# Patient Record
Sex: FEMALE | Race: WHITE | Employment: UNEMPLOYED | URBAN - METROPOLITAN AREA
[De-identification: names, ages, dates, MRNs, and addresses within clinical notes are randomized per-mention and may not be internally consistent; named-entity substitution may affect disease eponyms.]

---

## 2017-03-27 ENCOUNTER — HOSPITAL ENCOUNTER (EMERGENCY)
Age: 22
Discharge: HOME | End: 2017-03-28
Attending: EMERGENCY MEDICINE
Payer: COMMERCIAL

## 2017-03-27 VITALS
RESPIRATION RATE: 16 BRPM | HEART RATE: 74 BPM | TEMPERATURE: 98.2 F | HEIGHT: 70 IN | SYSTOLIC BLOOD PRESSURE: 124 MMHG | BODY MASS INDEX: 24.34 KG/M2 | OXYGEN SATURATION: 96 % | WEIGHT: 170 LBS | DIASTOLIC BLOOD PRESSURE: 50 MMHG

## 2017-03-27 DIAGNOSIS — R79.89 ELEVATED LFTS: ICD-10-CM

## 2017-03-27 DIAGNOSIS — R11.10 VOMITING AND DIARRHEA: Primary | ICD-10-CM

## 2017-03-27 DIAGNOSIS — R10.84 ABDOMINAL PAIN, GENERALIZED: ICD-10-CM

## 2017-03-27 DIAGNOSIS — R19.7 VOMITING AND DIARRHEA: Primary | ICD-10-CM

## 2017-03-27 LAB
ALBUMIN SERPL BCP-MCNC: 4.1 G/DL (ref 3.4–5)
ALBUMIN/GLOB SERPL: 1.1 {RATIO} (ref 0.8–1.7)
ALP SERPL-CCNC: 58 U/L (ref 45–117)
ALT SERPL-CCNC: 123 U/L (ref 13–56)
ANION GAP BLD CALC-SCNC: 7 MMOL/L (ref 3–18)
APPEARANCE UR: CLEAR
AST SERPL W P-5'-P-CCNC: 163 U/L (ref 15–37)
BASOPHILS # BLD AUTO: 0 K/UL (ref 0–0.06)
BASOPHILS # BLD: 0 % (ref 0–2)
BILIRUB DIRECT SERPL-MCNC: 0.2 MG/DL (ref 0–0.2)
BILIRUB SERPL-MCNC: 1.1 MG/DL (ref 0.2–1)
BILIRUB UR QL: NEGATIVE
BUN SERPL-MCNC: 9 MG/DL (ref 7–18)
BUN/CREAT SERPL: 11 (ref 12–20)
CALCIUM SERPL-MCNC: 9.3 MG/DL (ref 8.5–10.1)
CHLORIDE SERPL-SCNC: 96 MMOL/L (ref 100–108)
CO2 SERPL-SCNC: 26 MMOL/L (ref 21–32)
COLOR UR: YELLOW
CREAT SERPL-MCNC: 0.81 MG/DL (ref 0.6–1.3)
DIFFERENTIAL METHOD BLD: ABNORMAL
EOSINOPHIL # BLD: 0 K/UL (ref 0–0.4)
EOSINOPHIL NFR BLD: 0 % (ref 0–5)
EPITH CASTS URNS QL MICRO: NORMAL /LPF (ref 0–5)
ERYTHROCYTE [DISTWIDTH] IN BLOOD BY AUTOMATED COUNT: 12.7 % (ref 11.6–14.5)
FLUAV AG NPH QL IA: NEGATIVE
FLUBV AG NOSE QL IA: NEGATIVE
GLOBULIN SER CALC-MCNC: 3.6 G/DL (ref 2–4)
GLUCOSE SERPL-MCNC: 98 MG/DL (ref 74–99)
GLUCOSE UR STRIP.AUTO-MCNC: NEGATIVE MG/DL
HCG UR QL: NEGATIVE
HCT VFR BLD AUTO: 39.2 % (ref 35–45)
HGB BLD-MCNC: 13.2 G/DL (ref 12–16)
HGB UR QL STRIP: ABNORMAL
KETONES UR QL STRIP.AUTO: 80 MG/DL
LEUKOCYTE ESTERASE UR QL STRIP.AUTO: NEGATIVE
LIPASE SERPL-CCNC: 146 U/L (ref 73–393)
LYMPHOCYTES # BLD AUTO: 4 % (ref 21–52)
LYMPHOCYTES # BLD: 0.3 K/UL (ref 0.9–3.6)
MCH RBC QN AUTO: 30.1 PG (ref 24–34)
MCHC RBC AUTO-ENTMCNC: 33.7 G/DL (ref 31–37)
MCV RBC AUTO: 89.3 FL (ref 74–97)
MONOCYTES # BLD: 0.6 K/UL (ref 0.05–1.2)
MONOCYTES NFR BLD AUTO: 6 % (ref 3–10)
NEUTS SEG # BLD: 8.2 K/UL (ref 1.8–8)
NEUTS SEG NFR BLD AUTO: 90 % (ref 40–73)
NITRITE UR QL STRIP.AUTO: NEGATIVE
PH UR STRIP: 7 [PH] (ref 5–8)
PLATELET # BLD AUTO: 258 K/UL (ref 135–420)
PMV BLD AUTO: 9.3 FL (ref 9.2–11.8)
POTASSIUM SERPL-SCNC: 3.8 MMOL/L (ref 3.5–5.5)
PROT SERPL-MCNC: 7.7 G/DL (ref 6.4–8.2)
PROT UR STRIP-MCNC: NEGATIVE MG/DL
RBC # BLD AUTO: 4.39 M/UL (ref 4.2–5.3)
RBC #/AREA URNS HPF: NORMAL /HPF (ref 0–5)
SODIUM SERPL-SCNC: 129 MMOL/L (ref 136–145)
SP GR UR REFRACTOMETRY: 1.02 (ref 1–1.03)
UROBILINOGEN UR QL STRIP.AUTO: 1 EU/DL (ref 0.2–1)
WBC # BLD AUTO: 9.1 K/UL (ref 4.6–13.2)
WBC URNS QL MICRO: NORMAL /HPF (ref 0–4)

## 2017-03-27 PROCEDURE — 80076 HEPATIC FUNCTION PANEL: CPT | Performed by: EMERGENCY MEDICINE

## 2017-03-27 PROCEDURE — 96375 TX/PRO/DX INJ NEW DRUG ADDON: CPT

## 2017-03-27 PROCEDURE — 74011250637 HC RX REV CODE- 250/637: Performed by: EMERGENCY MEDICINE

## 2017-03-27 PROCEDURE — 96361 HYDRATE IV INFUSION ADD-ON: CPT

## 2017-03-27 PROCEDURE — 99283 EMERGENCY DEPT VISIT LOW MDM: CPT

## 2017-03-27 PROCEDURE — 81025 URINE PREGNANCY TEST: CPT | Performed by: EMERGENCY MEDICINE

## 2017-03-27 PROCEDURE — 85025 COMPLETE CBC W/AUTO DIFF WBC: CPT | Performed by: EMERGENCY MEDICINE

## 2017-03-27 PROCEDURE — 87804 INFLUENZA ASSAY W/OPTIC: CPT | Performed by: EMERGENCY MEDICINE

## 2017-03-27 PROCEDURE — 74011000250 HC RX REV CODE- 250: Performed by: EMERGENCY MEDICINE

## 2017-03-27 PROCEDURE — 80048 BASIC METABOLIC PNL TOTAL CA: CPT | Performed by: EMERGENCY MEDICINE

## 2017-03-27 PROCEDURE — 96374 THER/PROPH/DIAG INJ IV PUSH: CPT

## 2017-03-27 PROCEDURE — 74011250636 HC RX REV CODE- 250/636: Performed by: EMERGENCY MEDICINE

## 2017-03-27 PROCEDURE — 81001 URINALYSIS AUTO W/SCOPE: CPT | Performed by: EMERGENCY MEDICINE

## 2017-03-27 PROCEDURE — 83690 ASSAY OF LIPASE: CPT | Performed by: EMERGENCY MEDICINE

## 2017-03-27 RX ORDER — ONDANSETRON 4 MG/1
4 TABLET, ORALLY DISINTEGRATING ORAL
Qty: 14 TAB | Refills: 0 | Status: SHIPPED | OUTPATIENT
Start: 2017-03-27 | End: 2018-10-24 | Stop reason: ALTCHOICE

## 2017-03-27 RX ORDER — LOPERAMIDE HYDROCHLORIDE 2 MG/1
2 CAPSULE ORAL ONCE
Status: COMPLETED | OUTPATIENT
Start: 2017-03-27 | End: 2017-03-27

## 2017-03-27 RX ORDER — FAMOTIDINE 10 MG/ML
20 INJECTION INTRAVENOUS
Status: COMPLETED | OUTPATIENT
Start: 2017-03-27 | End: 2017-03-27

## 2017-03-27 RX ORDER — SODIUM CHLORIDE 9 MG/ML
1000 INJECTION, SOLUTION INTRAVENOUS ONCE
Status: COMPLETED | OUTPATIENT
Start: 2017-03-27 | End: 2017-03-27

## 2017-03-27 RX ORDER — ONDANSETRON 2 MG/ML
4 INJECTION INTRAMUSCULAR; INTRAVENOUS
Status: COMPLETED | OUTPATIENT
Start: 2017-03-27 | End: 2017-03-27

## 2017-03-27 RX ORDER — FAMOTIDINE 20 MG/1
20 TABLET, FILM COATED ORAL DAILY
Qty: 10 TAB | Refills: 0 | Status: SHIPPED | OUTPATIENT
Start: 2017-03-27 | End: 2017-04-06

## 2017-03-27 RX ORDER — KETOROLAC TROMETHAMINE 30 MG/ML
30 INJECTION, SOLUTION INTRAMUSCULAR; INTRAVENOUS
Status: COMPLETED | OUTPATIENT
Start: 2017-03-27 | End: 2017-03-27

## 2017-03-27 RX ADMIN — KETOROLAC TROMETHAMINE 30 MG: 30 INJECTION INTRAMUSCULAR; INTRAVENOUS at 21:48

## 2017-03-27 RX ADMIN — LOPERAMIDE HYDROCHLORIDE 2 MG: 2 CAPSULE ORAL at 22:17

## 2017-03-27 RX ADMIN — SODIUM CHLORIDE 1000 ML: 900 INJECTION, SOLUTION INTRAVENOUS at 21:47

## 2017-03-27 RX ADMIN — FAMOTIDINE 20 MG: 10 INJECTION, SOLUTION INTRAVENOUS at 21:48

## 2017-03-27 RX ADMIN — ONDANSETRON HYDROCHLORIDE 4 MG: 2 SOLUTION INTRAMUSCULAR; INTRAVENOUS at 21:48

## 2017-03-27 RX ADMIN — SODIUM CHLORIDE 1000 ML: 900 INJECTION, SOLUTION INTRAVENOUS at 22:26

## 2017-03-27 NOTE — LETTER
[address] [place] [address] [facility] 
[address] [facility] [id number] [phone] Work/School Note Date: 3/27/2017 To Whom It May concern: 
 
Mary Foster was seen and treated today in the emergency room by the following provider(s): 
Attending Provider: Henry Ball MD. Mary Foster may return to work on 3/29/17. Sincerely, Lenora Duffy RN

## 2017-03-28 NOTE — DISCHARGE INSTRUCTIONS
Return for pain, fever, shortness of breath, vomiting, decreased fluid intake, weakness, numbness, dizziness, or any change or concerns. Avoid tylenol and alcohol until follow up as we discussed. Diarrhea: Care Instructions  Your Care Instructions    Diarrhea is loose, watery stools (bowel movements). The exact cause is often hard to find. Sometimes diarrhea is your body's way of getting rid of what caused an upset stomach. Viruses, food poisoning, and many medicines can cause diarrhea. Some people get diarrhea in response to emotional stress, anxiety, or certain foods. Almost everyone has diarrhea now and then. It usually isn't serious, and your stools will return to normal soon. The important thing to do is replace the fluids you have lost, so you can prevent dehydration. The doctor has checked you carefully, but problems can develop later. If you notice any problems or new symptoms, get medical treatment right away. Follow-up care is a key part of your treatment and safety. Be sure to make and go to all appointments, and call your doctor if you are having problems. It's also a good idea to know your test results and keep a list of the medicines you take. How can you care for yourself at home? · Watch for signs of dehydration, which means your body has lost too much water. Dehydration is a serious condition and should be treated right away. Signs of dehydration are:  ¨ Increasing thirst and dry eyes and mouth. ¨ Feeling faint or lightheaded. ¨ Darker urine, and a smaller amount of urine than normal.  · To prevent dehydration, drink plenty of fluids, enough so that your urine is light yellow or clear like water. Choose water and other caffeine-free clear liquids until you feel better. If you have kidney, heart, or liver disease and have to limit fluids, talk with your doctor before you increase the amount of fluids you drink.   · Begin eating small amounts of mild foods the next day, if you feel like it. ¨ Try yogurt that has live cultures of Lactobacillus. (Check the label.)  ¨ Avoid spicy foods, fruits, alcohol, and caffeine until 48 hours after all symptoms are gone. ¨ Avoid chewing gum that contains sorbitol. ¨ Avoid dairy products (except for yogurt with Lactobacillus) while you have diarrhea and for 3 days after symptoms are gone. · The doctor may recommend that you take over-the-counter medicine, such as loperamide (Imodium), if you still have diarrhea after 6 hours. Read and follow all instructions on the label. Do not use this medicine if you have bloody diarrhea, a high fever, or other signs of serious illness. Call your doctor if you think you are having a problem with your medicine. When should you call for help? Call 911 anytime you think you may need emergency care. For example, call if:  · You passed out (lost consciousness). · Your stools are maroon or very bloody. Call your doctor now or seek immediate medical care if:  · You are dizzy or lightheaded, or you feel like you may faint. · Your stools are black and look like tar, or they have streaks of blood. · You have new or worse belly pain. · You have symptoms of dehydration, such as:  ¨ Dry eyes and a dry mouth. ¨ Passing only a little dark urine. ¨ Feeling thirstier than usual.  · You have a new or higher fever. Watch closely for changes in your health, and be sure to contact your doctor if:  · Your diarrhea is getting worse. · You see pus in the diarrhea. · You are not getting better after 2 days (48 hours). Where can you learn more? Go to http://dionne-cezar.info/. Enter H331 in the search box to learn more about \"Diarrhea: Care Instructions. \"  Current as of: May 27, 2016  Content Version: 11.1  © 2676-2552 Printed Piece, [employer]. Care instructions adapted under license by [employer] (which disclaims liability or warranty for this information).  If you have questions about a medical condition or this instruction, always ask your healthcare professional. [address] any warranty or liability for your use of this information. Abdominal Pain: Care Instructions  Your Care Instructions    Abdominal pain has many possible causes. Some aren't serious and get better on their own in a few days. Others need more testing and treatment. If your pain continues or gets worse, you need to be rechecked and may need more tests to find out what is wrong. You may need surgery to correct the problem. Don't ignore new symptoms, such as fever, nausea and vomiting, urination problems, pain that gets worse, and dizziness. These may be signs of a more serious problem. Your doctor may have recommended a follow-up visit in the next 8 to 12 hours. If you are not getting better, you may need more tests or treatment. The doctor has checked you carefully, but problems can develop later. If you notice any problems or new symptoms, get medical treatment right away. Follow-up care is a key part of your treatment and safety. Be sure to make and go to all appointments, and call your doctor if you are having problems. It's also a good idea to know your test results and keep a list of the medicines you take. How can you care for yourself at home? · Rest until you feel better. · To prevent dehydration, drink plenty of fluids, enough so that your urine is light yellow or clear like water. Choose water and other caffeine-free clear liquids until you feel better. If you have kidney, heart, or liver disease and have to limit fluids, talk with your doctor before you increase the amount of fluids you drink. · If your stomach is upset, eat mild foods, such as rice, dry toast or crackers, bananas, and applesauce. Try eating several small meals instead of two or three large ones.   · Wait until 48 hours after all symptoms have gone away before you have spicy foods, alcohol, and drinks that contain caffeine. · Do not eat foods that are high in fat. · Avoid anti-inflammatory medicines such as aspirin, ibuprofen (Advil, Motrin), and naproxen (Aleve). These can cause stomach upset. Talk to your doctor if you take daily aspirin for another health problem. When should you call for help? Call 911 anytime you think you may need emergency care. For example, call if:  · You passed out (lost consciousness). · You pass maroon or very bloody stools. · You vomit blood or what looks like coffee grounds. · You have new, severe belly pain. Call your doctor now or seek immediate medical care if:  · Your pain gets worse, especially if it becomes focused in one area of your belly. · You have a new or higher fever. · Your stools are black and look like tar, or they have streaks of blood. · You have unexpected vaginal bleeding. · You have symptoms of a urinary tract infection. These may include:  ¨ Pain when you urinate. ¨ Urinating more often than usual.  ¨ Blood in your urine. · You are dizzy or lightheaded, or you feel like you may faint. Watch closely for changes in your health, and be sure to contact your doctor if:  · You are not getting better after 1 day (24 hours). Where can you learn more? Go to http://dionne-cezar.info/. Enter I907 in the search box to learn more about \"Abdominal Pain: Care Instructions. \"  Current as of: May 27, 2016  Content Version: 11.1  © 8250-2296 [employer]. Care instructions adapted under license by [employer] (which disclaims liability or warranty for this information). If you have questions about a medical condition or this instruction, always ask your healthcare professional. [place] [address] any warranty or liability for your use of this information.            Nausea and Vomiting: Care Instructions  Your Care Instructions    When you are nauseated, you may feel weak and sweaty and notice a lot of saliva in your mouth. Nausea often leads to vomiting. Most of the time you do not need to worry about nausea and vomiting, but they can be signs of other illnesses. Two common causes of nausea and vomiting are stomach flu and food poisoning. Nausea and vomiting from viral stomach flu will usually start to improve within 24 hours. Nausea and vomiting from food poisoning may last from 12 to 48 hours. The doctor has checked you carefully, but problems can develop later. If you notice any problems or new symptoms, get medical treatment right away. Follow-up care is a key part of your treatment and safety. Be sure to make and go to all appointments, and call your doctor if you are having problems. It's also a good idea to know your test results and keep a list of the medicines you take. How can you care for yourself at home? · To prevent dehydration, drink plenty of fluids, enough so that your urine is light yellow or clear like water. Choose water and other caffeine-free clear liquids until you feel better. If you have kidney, heart, or liver disease and have to limit fluids, talk with your doctor before you increase the amount of fluids you drink. · Rest in bed until you feel better. · When you are able to eat, try clear soups, mild foods, and liquids until all symptoms are gone for 12 to 48 hours. Other good choices include dry toast, crackers, cooked cereal, and gelatin dessert, such as Jell-O. When should you call for help? Call 911 anytime you think you may need emergency care. For example, call if:  · You passed out (lost consciousness). Call your doctor now or seek immediate medical care if:  · You have symptoms of dehydration, such as:  ¨ Dry eyes and a dry mouth. ¨ Passing only a little dark urine. ¨ Feeling thirstier than usual.  · You have new or worsening belly pain. · You have a new or higher fever. · You vomit blood or what looks like coffee grounds.   Watch closely for changes in your health, and be sure to contact your doctor if:  · You have ongoing nausea and vomiting. · Your vomiting is getting worse. · Your vomiting lasts longer than 2 days. · You are not getting better as expected. Where can you learn more? Go to http://dionne-cezar.info/. Enter 25 [id number] in the search box to learn more about \"Nausea and Vomiting: Care Instructions. \"  Current as of: May 27, 2016  Content Version: 11.1  © 5919-0504 [employer]. Care instructions adapted under license by [employer] (which disclaims liability or warranty for this information). If you have questions about a medical condition or this instruction, always ask your healthcare professional. [place] [address] any warranty or liability for your use of this information.

## 2017-03-28 NOTE — ED PROVIDER NOTES
HPI Comments: 9:04 PM Renee  is a 22 y.o. female presenting to the ED with watery diarrhea that began today. The patient also reports hot flashes, light headedness, chills, generalized myalgias, cough, abd pain, lower back pain and vomiting as associated symptoms. The vomiting began at 2 pm and the diarrhea has been all day. She has been able to keep water down. She describes the abd pain as cramping. Her mom states she does crossfit and she recently did an intense workout recently too. Pt denies urinary symptoms. No other complaints at this time. Patient is a 22 y.o. female presenting with vomiting, cough, and diarrhea. The history is provided by the patient. Vomiting    Associated symptoms include abdominal pain, diarrhea, myalgias and cough. Pertinent negatives include no fever. Cough   Associated symptoms include myalgias, nausea and vomiting. Pertinent negatives include no chest pain and no shortness of breath. Diarrhea    Associated symptoms include diarrhea, nausea, vomiting, myalgias and back pain. Pertinent negatives include no fever and no chest pain. History reviewed. No pertinent past medical history. History reviewed. No pertinent surgical history. Family History:   Problem Relation Age of Onset    Hypertension Mother     Stroke Neg Hx     Cancer Neg Hx     Diabetes Neg Hx     Heart Disease Neg Hx        Social History     Social History    Marital status: SINGLE     Spouse name: N/A    Number of children: N/A    Years of education: N/A     Occupational History    Not on file. Social History Main Topics    Smoking status: Never Smoker    Smokeless tobacco: Not on file    Alcohol use Yes      Comment: socially/rarely    Drug use: No    Sexual activity: Yes     Birth control/ protection: Pill     Other Topics Concern    Not on file     Social History Narrative         ALLERGIES: Review of patient's allergies indicates no known allergies.     Review of Systems   Constitutional: Negative for fever. HENT: Negative for congestion. Respiratory: Positive for cough. Negative for shortness of breath. Cardiovascular: Negative for chest pain. Gastrointestinal: Positive for abdominal pain, diarrhea, nausea and vomiting. Musculoskeletal: Positive for back pain and myalgias. Skin: Negative for rash. Neurological: Negative for light-headedness. All other systems reviewed and are negative. Vitals:    03/27/17 2052 03/27/17 2349   BP: 115/69 124/50   Pulse: (!) 106 74   Resp: 18 16   Temp: 98.2 °F (36.8 °C)    SpO2: 100% 96%   Weight: 77.1 kg (170 lb)    Height: 5' 9.5\" (1.765 m)             Physical Exam   Constitutional: She is oriented to person, place, and time. She appears well-developed. pale   HENT:   Head: Normocephalic. Mouth/Throat: Oropharynx is clear and moist. Mucous membranes are pale and dry. Eyes: Pupils are equal, round, and reactive to light. Neck: Normal range of motion. Cardiovascular: Normal rate and normal heart sounds. No murmur heard. Pulmonary/Chest: Effort normal. She has no wheezes. She has no rales. Abdominal: Soft. There is tenderness. Mild diffuse mid abd tenderness   Musculoskeletal: Normal range of motion. She exhibits no edema. Neurological: She is alert and oriented to person, place, and time. Skin: Skin is warm and dry. Nursing note and vitals reviewed. MDM  ED Course       Procedures      Vitals:  No data found. Pulsox interpreted within normal limits.      Medications ordered:   Medications   0.9% sodium chloride infusion 1,000 mL (0 mL IntraVENous IV Completed 3/27/17 2247)   ondansetron (ZOFRAN) injection 4 mg (4 mg IntraVENous Given 3/27/17 2148)   famotidine (PF) (PEPCID) injection 20 mg (20 mg IntraVENous Given 3/27/17 2148)   ketorolac (TORADOL) injection 30 mg (30 mg IntraVENous Given 3/27/17 2148)   loperamide (IMODIUM) capsule 2 mg (2 mg Oral Given 3/27/17 2217)   0.9% sodium chloride infusion 1,000 mL (0 mL IntraVENous IV Completed 3/27/17 6015)         Lab findings:  No results found for this or any previous visit (from the past 12 hour(s)). Progress notes, Consult notes or additional Procedure notes:   10:58 PM Pt reevaluated at this time and is resting comfortably in NAD feeling improved. No nausea no tenderness. 11:38 PM Pt is pain free tolerating fluids well. 11:38 PM Pt aware of need to avoid Tylenol and alcohol for LFT elevation. Discussed results and findings, as well as, diagnosis and plan for discharge. Pt verbalizes understanding and agreement with plan. All questions addressed at this time. Disposition:  Diagnosis:   1. Vomiting and diarrhea    2. Elevated LFTs    3. Abdominal pain, generalized        Disposition: discharged    Follow-up Information     Follow up With Details Comments [address], [place] Schedule an appointment as soon as possible for a visit in 2 days  [address] [facility] [address] [facility] [address] an appointment as soon as possible for a visit in 2 days  [address] [facility] [address] [facility]  [phone]           Discharge Medication List as of 3/27/2017 11:40 PM      START taking these medications    Details   ondansetron (ZOFRAN ODT) 4 mg disintegrating tablet Take 1 Tab by mouth every eight (8) hours as needed for Nausea. , Print, Disp-14 Tab, R-0      famotidine (PEPCID) 20 mg tablet Take 1 Tab by mouth daily for 10 days. , Print, Disp-10 Tab, R-0         CONTINUE these medications which have NOT CHANGED    Details   norgestimate-ethinyl estradiol (ORTHO-CYCLEN) 0.25-35 mg-mcg tab Take 1 Tab by mouth daily. , Historical Med             SCRIBE ATTESTATION STATEMENT  Documented by: Rebeca Guzmán scribing for, and in the presence of,No att. providers found      Signed by: Sukhwinder Cheng, 03/30/17, 9:06 PM    PROVIDER ATTESTATION STATEMENT  I personally performed the services described in the documentation, reviewed the documentation, as recorded by the scribe in my presence, and it accurately and completely records my words and actions.   No att. providers found

## 2018-04-04 ENCOUNTER — OFFICE VISIT (OUTPATIENT)
Dept: INTERNAL MEDICINE CLINIC | Age: 23
End: 2018-04-04

## 2018-04-04 VITALS
BODY MASS INDEX: 24.91 KG/M2 | RESPIRATION RATE: 14 BRPM | HEIGHT: 70 IN | TEMPERATURE: 98.5 F | SYSTOLIC BLOOD PRESSURE: 114 MMHG | HEART RATE: 83 BPM | DIASTOLIC BLOOD PRESSURE: 78 MMHG | WEIGHT: 174 LBS | OXYGEN SATURATION: 98 %

## 2018-04-04 DIAGNOSIS — J02.9 PHARYNGITIS, UNSPECIFIED ETIOLOGY: ICD-10-CM

## 2018-04-04 DIAGNOSIS — J01.00 ACUTE MAXILLARY SINUSITIS, RECURRENCE NOT SPECIFIED: Primary | ICD-10-CM

## 2018-04-04 RX ORDER — AZITHROMYCIN 250 MG/1
TABLET, FILM COATED ORAL
Qty: 6 TAB | Refills: 0 | Status: SHIPPED | OUTPATIENT
Start: 2018-04-04 | End: 2018-04-09

## 2018-04-04 RX ORDER — LORATADINE 10 MG/1
10 TABLET ORAL DAILY
Qty: 30 TAB | Refills: 0 | Status: SHIPPED | OUTPATIENT
Start: 2018-04-04 | End: 2018-06-22 | Stop reason: SDUPTHER

## 2018-04-04 NOTE — LETTER
NOTIFICATION RETURN TO WORK / SCHOOL 
 
4/4/2018 12:13 PM 
 
Ms. Ramy Tillman [address] [facility] [address] To Whom It May Concern: 
 
Ramy Tillman is currently under the care of Mary Cardona. She will return to work/school on: 4/5/18 If there are questions or concerns please have the patient contact our office.  
 
 
 
Sincerely, 
 
 
Eun Means MD

## 2018-04-04 NOTE — MR AVS SNAPSHOT
[address] [facility] [address] 
 
 
 [address], [address] [facility] 
[phone] Patient: Chidi Carney MRN: [id number] Dara Juarez Visit Information Date & Time Provider Department Dept. Phone Encounter #  
 4/4/2018 11:30 AM Brianne Sanchez MD [facility] [address] [place] [address] [phone] [id number] Follow-up Instructions Return if symptoms worsen or fail to improve. Your Appointments 7/2/2018  3:00 PM  
New Patient with Nancy Fitzpatrick MD  
[facility] [address] [place] [address] [facility]) Appt Note: New Patient - Physical  
 [address] [facility] [id number] [address] [facility]  
  
   
 [address], [address] Upcoming Health Maintenance Date Due  
 HPV AGE 9Y-34Y (1 of 3 - Female 3 Dose Series) 8/9/2007 Influenza Age 5 to Adult 8/1/2017 DTaP/Tdap/Td series (1 - Tdap) 8/9/2017 PAP AKA CERVICAL CYTOLOGY 8/9/2017 Allergies as of 4/4/2018  Review Complete On: 4/4/2018 By: Brianne Sanchez MD  
 No Known Allergies Current Immunizations  Never Reviewed No immunizations on file. Not reviewed this visit You Were Diagnosed With   
  
 Codes Comments Acute maxillary sinusitis, recurrence not specified    -  Primary ICD-10-CM: J01.00 ICD-9-CM: 461.0 Pharyngitis, unspecified etiology     ICD-10-CM: J02.9 ICD-9-CM: 797 Vitals BP Pulse Temp Resp Height(growth percentile) Weight(growth percentile) 114/78 (BP 1 Location: Left arm, BP Patient Position: Sitting) 83 98.5 °F (36.9 °C) (Oral) 14 5' 9.5\" (1.765 m) 174 lb (78.9 kg) SpO2 BMI OB Status Smoking Status 98% 25.33 kg/m2 Having regular periods Never Smoker Vitals History BMI and BSA Data Body Mass Index Body Surface Area  
 25.33 kg/m 2 1.97 m 2 Preferred Pharmacy Pharmacy Name Phone [address] [phone] Your Updated Medication List  
  
   
This list is accurate as of 4/4/18 12:18 PM.  Always use your most recent med list.  
  
  
  
  
 azithromycin 250 mg tablet Commonly known as:  Aiden Nazario Take 2 tablets today, then take 1 tablet daily  
  
 loratadine 10 mg tablet Commonly known as:  Autumn Chimera Take 1 Tab by mouth daily. Indications: Allergic Rhinitis  
  
 norgestimate-ethinyl estradiol 0.25-35 mg-mcg Tab Commonly known as:  Fleming Demark Take 1 Tab by mouth daily. ondansetron 4 mg disintegrating tablet Commonly known as:  ZOFRAN ODT Take 1 Tab by mouth every eight (8) hours as needed for Nausea. Prescriptions Sent to Pharmacy Refills  
 azithromycin (ZITHROMAX) 250 mg tablet 0 Sig: Take 2 tablets today, then take 1 tablet daily Class: Normal  
 Pharmacy: [facility] [address] [place] [address] Ph #: [phone]  
 loratadine (CLARITIN) 10 mg tablet 0 Sig: Take 1 Tab by mouth daily. Indications: Allergic Rhinitis Class: Normal  
 Pharmacy: [address] Ph #: [phone] Route: Oral  
  
We Performed the Following AMB POC RAPID STREP A [03268 CPT(R)] Follow-up Instructions Return if symptoms worsen or fail to improve. Introducing [facility]! [facility] introduces CumuLogic patient portal. Now you can access parts of your medical record, email your doctor's office, and request medication refills online. 1. In your internet browser, go to [email or web address] 2. Click on the First Time User? Click Here link in the Sign In box. You will see the New Member Sign Up page. 3. Enter your CumuLogic Access Code exactly as it appears below. You will not need to use this code after youve completed the sign-up process.  If you do not sign up before the expiration date, you must request a new code. · Mobilitie Access Code: [email or web address] Expires: 7/3/2018 11:20 AM 
 
4. Enter the last four digits of your Social Security Number (xxxx) and Date of Birth (mm/dd/yyyy) as indicated and click Submit. You will be taken to the next sign-up page. 5. Create a Mobilitie ID. This will be your Mobilitie login ID and cannot be changed, so think of one that is secure and easy to remember. 6. Create a Mobilitie password. You can change your password at any time. 7. Enter your Password Reset Question and Answer. This can be used at a later time if you forget your password. 8. Enter your e-mail address. You will receive e-mail notification when new information is available in [address] 9. Click Sign Up. You can now view and download portions of your medical record. 10. Click the Download Summary menu link to download a portable copy of your medical information. If you have questions, please visit the Frequently Asked Questions section of the Mobilitie website. Remember, Mobilitie is NOT to be used for urgent needs. For medical emergencies, dial 911. Now available from your iPhone and Android! Please provide this summary of care documentation to your next provider. Your primary care clinician is listed as Nir Rojas. Renee Leal. If you have any questions after today's visit, please call [phone].

## 2018-04-04 NOTE — PROGRESS NOTES
1. Have you been to the ER, urgent care clinic or hospitalized since your last visit? NO.     2. Have you seen or consulted any other health care providers outside of the [address] [place] [address] since your last visit (Include any pap smears or colon screening)? NO      Do you have an Advanced Directive? NO    Would you like information on Advanced Directives?  NO

## 2018-04-04 NOTE — LETTER
NOTIFICATION RETURN TO WORK / SCHOOL 
 
4/4/2018 12:03 PM 
 
Ms. Lesley Felix [address] [facility] [address] To Whom It May Concern: 
 
Lesley Felix is currently under the care of Mary Cardona. She will return to work/school on: 4/5/18 If there are questions or concerns please have the patient contact our office.  
 
 
 
Sincerely, 
 
 
Jose Alberto Boyd MD

## 2018-04-04 NOTE — PROGRESS NOTES
HPI     Dinesh Munguia is a 25 y.o. female with no significant PMH who presents today for evaluation of sinus congestion, associated with headache, malaise, fever, chills, fatigue, cough with greenish sputum. Onset 4 days ago. Her \"fever\" was on Monday of 100 F. She rested at home and called in sick because she was feeling very ill and fatigued. She denies any h/o tobacco use. Did not get influenza vaccine last season. Unsure of sick contacts. HA is retro-orbital, frontal and maxillary, throbbing like mild-to-moderate, non radiated. She says she has had sinus infections in the past with similar presentation and has had h/o \"strep throat\" presenting with URI symptoms but no sore throat in the past. She denies any SOB, wheezing, CP, palpitations, dizziness, N/V/D. Denies any antibiotic allergies. Works as a . ROS  As above included in HPI. Otherwise 11 point review of systems negative including constitutional, skin, HENT, eyes, respiratory, cardiovascular, gastrointestinal, genitourinary, musculoskeletal, endocrine, hematologic, allergy, and neurologic. Past Medical History  No past medical history on file. No past surgical history on file. Family History  Family History   Problem Relation Age of Onset    Hypertension Mother     Stroke Neg Hx     Cancer Neg Hx     Diabetes Neg Hx     Heart Disease Neg Hx        Social History  She  reports that she has never smoked. She does not have any smokeless tobacco history on file. History   Alcohol Use    Yes     Comment: socially/rarely       Immunization History    There is no immunization history on file for this patient. Allergies  No Known Allergies    Medications  Current Outpatient Prescriptions   Medication Sig    norgestimate-ethinyl estradiol (ORTHO-CYCLEN) 0.25-35 mg-mcg tab Take 1 Tab by mouth daily.  ondansetron (ZOFRAN ODT) 4 mg disintegrating tablet Take 1 Tab by mouth every eight (8) hours as needed for Nausea.      No current facility-administered medications for this visit. Visit Vitals    /78 (BP 1 Location: Left arm, BP Patient Position: Sitting)    Pulse 83    Temp 98.5 °F (36.9 °C) (Oral)    Resp 14    Ht 5' 9.5\" (1.765 m)    Wt 174 lb (78.9 kg)    SpO2 98%    BMI 25.33 kg/m2     Body mass index is 25.33 kg/(m^2). Physical Exam   Constitutional: She is oriented to person, place, and time and well-developed, well-nourished, and in no distress. HENT:   Head: Normocephalic and atraumatic. Right Ear: External ear normal.   Left Ear: External ear normal.   Mouth/Throat: No oropharyngeal exudate. Enlarged tonsils, with no clear exudates, but significant congestion, erythema  Postnasal drip- white thick mucus  Nasal congestion   Eyes: EOM are normal. Pupils are equal, round, and reactive to light. Neck: Normal range of motion. Neck supple. Cardiovascular: Normal rate, regular rhythm, normal heart sounds and intact distal pulses. Pulmonary/Chest: Effort normal and breath sounds normal. No respiratory distress. Abdominal: Soft. Bowel sounds are normal.   Musculoskeletal: Normal range of motion. She exhibits no edema. Neurological: She is alert and oriented to person, place, and time. Skin: Skin is warm. No rash noted. No erythema. No pallor. Psychiatric: Mood and affect normal.   Nursing note and vitals reviewed. REVIEW OF DATA    Labs  No visits with results within 1 Month(s) from this visit.   Latest known visit with results is:    Admission on 03/27/2017, Discharged on 03/28/2017   Component Date Value Ref Range Status    Color 03/27/2017 YELLOW    Final    Appearance 03/27/2017 CLEAR    Final    Specific gravity 03/27/2017 1.022  1.005 - 1.030   Final    pH (UA) 03/27/2017 7.0  5.0 - 8.0   Final    Protein 03/27/2017 NEGATIVE   NEG mg/dL Final    Glucose 03/27/2017 NEGATIVE   NEG mg/dL Final    Ketone 03/27/2017 80* NEG mg/dL Final    Bilirubin 03/27/2017 NEGATIVE   NEG   Final  Blood 03/27/2017 SMALL* NEG   Final    Urobilinogen 03/27/2017 1.0  0.2 - 1.0 EU/dL Final    Nitrites 03/27/2017 NEGATIVE   NEG   Final    Leukocyte Esterase 03/27/2017 NEGATIVE   NEG   Final    HCG urine, QL 03/27/2017 NEGATIVE   NEG   Final    Influenza A Antigen 03/27/2017 NEGATIVE   NEG   Final    Influenza B Antigen 03/27/2017 NEGATIVE   NEG   Final    WBC 03/27/2017 0 to 1  0 - 4 /hpf Final    RBC 03/27/2017 11 to 20  0 - 5 /hpf Final    Epithelial cells 03/27/2017 2+  0 - 5 /lpf Final    WBC 03/27/2017 9.1  4.6 - 13.2 K/uL Final    RBC 03/27/2017 4.39  4.20 - 5.30 M/uL Final    HGB 03/27/2017 13.2  12.0 - 16.0 g/dL Final    HCT 03/27/2017 39.2  35.0 - 45.0 % Final    MCV 03/27/2017 89.3  74.0 - 97.0 FL Final    MCH 03/27/2017 30.1  24.0 - 34.0 PG Final    MCHC 03/27/2017 33.7  31.0 - 37.0 g/dL Final    RDW 03/27/2017 12.7  11.6 - 14.5 % Final    PLATELET 90/75/5741 486  135 - 420 K/uL Final    MPV 03/27/2017 9.3  9.2 - 11.8 FL Final    NEUTROPHILS 03/27/2017 90* 40 - 73 % Final    LYMPHOCYTES 03/27/2017 4* 21 - 52 % Final    MONOCYTES 03/27/2017 6  3 - 10 % Final    EOSINOPHILS 03/27/2017 0  0 - 5 % Final    BASOPHILS 03/27/2017 0  0 - 2 % Final    ABS. NEUTROPHILS 03/27/2017 8.2* 1.8 - 8.0 K/UL Final    ABS. LYMPHOCYTES 03/27/2017 0.3* 0.9 - 3.6 K/UL Final    ABS. MONOCYTES 03/27/2017 0.6  0.05 - 1.2 K/UL Final    ABS. EOSINOPHILS 03/27/2017 0.0  0.0 - 0.4 K/UL Final    ABS.  BASOPHILS 03/27/2017 0.0  0.0 - 0.06 K/UL Final    DF 03/27/2017 AUTOMATED    Final    Sodium 03/27/2017 129* 136 - 145 mmol/L Final    Potassium 03/27/2017 3.8  3.5 - 5.5 mmol/L Final    Chloride 03/27/2017 96* 100 - 108 mmol/L Final    CO2 03/27/2017 26  21 - 32 mmol/L Final    Anion gap 03/27/2017 7  3.0 - 18 mmol/L Final    Glucose 03/27/2017 98  74 - 99 mg/dL Final    BUN 03/27/2017 9  7.0 - 18 MG/DL Final    Creatinine 03/27/2017 0.81  0.6 - 1.3 MG/DL Final    BUN/Creatinine ratio 03/27/2017 11* 12 - 20   Final    GFR est AA 03/27/2017 >60  >60 ml/min/1.73m2 Final    GFR est non-AA 03/27/2017 >60  >60 ml/min/1.73m2 Final    Calcium 03/27/2017 9.3  8.5 - 10.1 MG/DL Final    Lipase 03/27/2017 146  73 - 393 U/L Final    Protein, total 03/27/2017 7.7  6.4 - 8.2 g/dL Final    Albumin 03/27/2017 4.1  3.4 - 5.0 g/dL Final    Globulin 03/27/2017 3.6  2.0 - 4.0 g/dL Final    A-G Ratio 03/27/2017 1.1  0.8 - 1.7   Final    Bilirubin, total 03/27/2017 1.1* 0.2 - 1.0 MG/DL Final    Bilirubin, direct 03/27/2017 0.2  0.0 - 0.2 MG/DL Final    Alk. phosphatase 03/27/2017 58  45 - 117 U/L Final    AST (SGOT) 03/27/2017 163* 15 - 37 U/L Final    ALT (SGPT) 03/27/2017 123* 13 - 56 U/L Final         CT Results (most recent):    Results from Hospital Encounter encounter on 08/14/15   CT ABD PELV WO CONT   Narrative Description:  CT abdomen and pelvis without oral or IV contrast    Clinical Indication:  Abdominal pain radiating to the left lower quadrant    Comparison: None. Findings:      CT abdomen:  Lung bases are clear. Solid organ parenchyma is not diagnostically evaluated without IV contrast. No  gross abnormality of the liver, spleen, pancreas or adrenal glands. The kidneys have a normal morphology. No hydronephrosis or mass. The stomach and the duodenum have a normal appearance. No hyperdense gallstones. No peritoneal free fluid or air. No adenopathy. No bowel obstruction. CT pelvis: The appendix appears normal (reference coronal, #[id number]). The bladder is not well-distended but grossly unremarkable. No mass, free fluid or adenopathy is seen within the pelvis. Skeleton/soft tissues:  Within normal limits. Impression Impression:      No etiology for patient's clinical symptoms. No acute intra-abdominal process.     Limited evaluation of solid organs due to lack of IV contrast.           XR Results (most recent):    Results from Hospital Encounter encounter on 11/19/11 XR CHEST SINGLE VIEW   Narrative Ordering MD: Alexys Krishnamurthy MD  Signed By: Tan Joyner MD  ** FINAL **  ---------------------------------------------------------------------  Procedure Date:  11/20/2011   Accession Number:  [id number]          Order No:   [id number]        Procedure:   [facility] - CHEST  1 VIEW       CPT Code:   31184     Admit Diag:   PT FAINTED             Reason:   CHEST PAIN  INTERPRETATION:  CHEST PORTABLE   CPT CODE: 22944  COMPARISON: 10/06/2010. INDICATIONS: Syncope. FINDINGS:   Portable Single View Chest Demonstrates:  Lungs: Clear. Cardiac Silhouette And Mediastinal Contours: Normal.  Pleural Spaces: No pneumothorax or pleural effusion evident. Bones And Soft Tissues: Unremarkable for age. IMPRESSION:  No active or acute cardiopulmonary process is evident. CT   All Micro Results     None           DIAGNOSIS AND PLAN  Patient Active Problem List   Diagnosis Code    Lump or mass in breast N63.0     1. Acute maxillary sinusitis, recurrence not specified  2. Pharyngitis, unspecified etiology  - AMB POC RAPID STREP A neg  - Azithromycin 5 day course recommended  - Supportive care with ibuprofen PRN for HA, fever, malaise and loratadine 10 mg PO every day for congestion, rhinorrhea. Return to clinic as needed in case symptoms do not improve or worsen. Follow-up Disposition: Not on File     Keke Hernandes MD

## 2018-06-22 ENCOUNTER — OFFICE VISIT (OUTPATIENT)
Dept: INTERNAL MEDICINE CLINIC | Age: 23
End: 2018-06-22

## 2018-06-22 VITALS
WEIGHT: 168 LBS | OXYGEN SATURATION: 98 % | HEIGHT: 70 IN | SYSTOLIC BLOOD PRESSURE: 108 MMHG | RESPIRATION RATE: 14 BRPM | BODY MASS INDEX: 24.05 KG/M2 | DIASTOLIC BLOOD PRESSURE: 76 MMHG | TEMPERATURE: 96.6 F | HEART RATE: 74 BPM

## 2018-06-22 DIAGNOSIS — J01.90 ACUTE SINUSITIS, RECURRENCE NOT SPECIFIED, UNSPECIFIED LOCATION: Primary | ICD-10-CM

## 2018-06-22 DIAGNOSIS — J01.00 ACUTE MAXILLARY SINUSITIS, RECURRENCE NOT SPECIFIED: ICD-10-CM

## 2018-06-22 DIAGNOSIS — J02.9 PHARYNGITIS, UNSPECIFIED ETIOLOGY: ICD-10-CM

## 2018-06-22 LAB
S PYO AG THROAT QL: NEGATIVE
VALID INTERNAL CONTROL?: YES

## 2018-06-22 RX ORDER — LORATADINE 10 MG/1
10 TABLET ORAL DAILY
Qty: 30 TAB | Refills: 0 | Status: SHIPPED | OUTPATIENT
Start: 2018-06-22 | End: 2019-02-01 | Stop reason: SDUPTHER

## 2018-06-22 RX ORDER — AMOXICILLIN AND CLAVULANATE POTASSIUM 875; 125 MG/1; MG/1
1 TABLET, FILM COATED ORAL EVERY 12 HOURS
Qty: 20 TAB | Refills: 0 | Status: SHIPPED | OUTPATIENT
Start: 2018-06-22 | End: 2018-10-24 | Stop reason: ALTCHOICE

## 2018-06-22 NOTE — LETTER
Medications reviewed and updated.  Denies known Latex allergy or symptoms of Latex sensitivity.     NOTIFICATION RETURN TO WORK / SCHOOL 
 
6/22/2018 3:27 PM 
 
Ms. Ramy Tillman [address] [facility] [address] To Whom It May Concern: 
 
Ramy Tillman is currently under the care of Mary Cardona. She will return to work/school on: 6/25/18 If there are questions or concerns please have the patient contact our office.  
 
 
 
Sincerely, 
 
 
Eun Means MD

## 2018-06-22 NOTE — MR AVS SNAPSHOT
[facility] 
 
 
 [address], [facility] [address] [facility] 
[phone] Patient: Erick Lizarraga MRN: [id number] Tima Nicholson Visit Information Date & Time Provider Department Dept. Phone Encounter #  
 6/22/2018  2:30 PM Cj Spring MD [facility] [place] [phone] Follow-up Instructions Return if symptoms worsen or fail to improve. Follow-up and Disposition History Your Appointments 7/18/2018 11:30 AM  
New Patient with Cj Spring MD  
[facility] [address] [facility]) Appt Note: New Patient - Physical; New Patient - Physical  
 [address], [place] [address] [place]  
  
   
 [address] [place], [facility] Upcoming Health Maintenance Date Due  
 HPV Age 9Y-34Y (1 of 3 - Female 3 Dose Series) 8/9/2007 DTaP/Tdap/Td series (1 - Tdap) 8/9/2017 PAP AKA CERVICAL CYTOLOGY 8/9/2017 Influenza Age 5 to Adult 8/1/2018 Allergies as of 6/22/2018  Review Complete On: 6/22/2018 By: Cj Spring MD  
 No Known Allergies Current Immunizations  Never Reviewed No immunizations on file. Not reviewed this visit You Were Diagnosed With   
  
 Codes Comments Acute sinusitis, recurrence not specified, unspecified location    -  Primary ICD-10-CM: J01.90 ICD-9-CM: 461.9 Pharyngitis, unspecified etiology     ICD-10-CM: J02.9 ICD-9-CM: 711 Acute maxillary sinusitis, recurrence not specified     ICD-10-CM: J01.00 ICD-9-CM: 461.0 Vitals BP Pulse Temp Resp Height(growth percentile) Weight(growth percentile) 108/76 74 96.6 °F (35.9 °C) (Oral) 14 5' 9.5\" (1.765 m) 168 lb (76.2 kg) LMP SpO2 BMI OB Status Smoking Status 06/12/2018 (Exact Date) 98% 24.45 kg/m2 Having regular periods Never Smoker Vitals History BMI and BSA Data Body Mass Index Body Surface Area  
 24.45 kg/m 2 1.93 m 2 Preferred Pharmacy Pharmacy Name Phone [address] [phone] Your Updated Medication List  
  
   
This list is accurate as of 6/22/18  3:44 PM.  Always use your most recent med list.  
  
  
  
  
 amoxicillin-clavulanate 875-125 mg per tablet Commonly known as:  AUGMENTIN Take 1 Tab by mouth every twelve (12) hours. loratadine 10 mg tablet Commonly known as:  Verita Joselin Take 1 Tab by mouth daily. Indications: Allergic Rhinitis  
  
 norgestimate-ethinyl estradiol 0.25-35 mg-mcg Tab Commonly known as:  Oralee Binning Take 1 Tab by mouth daily. ondansetron 4 mg disintegrating tablet Commonly known as:  ZOFRAN ODT Take 1 Tab by mouth every eight (8) hours as needed for Nausea. Prescriptions Sent to Pharmacy Refills  
 amoxicillin-clavulanate (AUGMENTIN) 875-125 mg per tablet 0 Sig: Take 1 Tab by mouth every twelve (12) hours. Class: Normal  
 Pharmacy: [address] #: [phone] Route: Oral  
 loratadine (CLARITIN) 10 mg tablet 0 Sig: Take 1 Tab by mouth daily. Indications: Allergic Rhinitis Class: Normal  
 Pharmacy: [address] #: [phone] Route: Oral  
  
We Performed the Following AMB POC RAPID STREP A [07804 CPT(R)] Follow-up Instructions Return if symptoms worsen or fail to improve. Introducing [facility]! Robert Brown introduces [employer] patient portal. Now you can access parts of your medical record, email your doctor's office, and request medication refills online. 1. In your internet browser, go to [email or web address] 2. Click on the First Time User? Click Here link in the Sign In box. You will see the New Member Sign Up page. 3. Enter your [employer] Access Code exactly as it appears below. You will not need to use this code after youve completed the sign-up process. If you do not sign up before the expiration date, you must request a new code. · [employer] Access Code: [email or web address] Expires: 7/3/2018 11:20 AM 
 
4. Enter the last four digits of your Social Security Number (xxxx) and Date of Birth (mm/dd/yyyy) as indicated and click Submit. You will be taken to the next sign-up page. 5. Create a [employer] ID. This will be your [employer] login ID and cannot be changed, so think of one that is secure and easy to remember. 6. Create a [employer] password. You can change your password at any time. 7. Enter your Password Reset Question and Answer. This can be used at a later time if you forget your password. 8. Enter your e-mail address. You will receive e-mail notification when new information is available in [address]. 9. Click Sign Up. You can now view and download portions of your medical record. 10. Click the Download Summary menu link to download a portable copy of your medical information. If you have questions, please visit the Frequently Asked Questions section of the [employer] website. Remember, [employer] is NOT to be used for urgent needs. For medical emergencies, dial 911. Now available from your iPhone and Android! Please provide this summary of care documentation to your next provider. Your primary care clinician is listed as Elvi Ball. If you have any questions after today's visit, please call [phone].

## 2018-06-22 NOTE — PROGRESS NOTES
HPI     Rafael Lerma is a 25 y.o. female with no significant PMH who presents today for evaluation of sinus congestion, associated with headache, malaise, fever, chills, fatigue, cough with greenish sputum. Onset 4 days ago. Her \"fever\" was on Monday of 100 F. She rested at home and called in sick because she was feeling very ill and fatigued. She denies any h/o tobacco use. Did not get influenza vaccine last season. Unsure of sick contacts. HA is retro-orbital, frontal and maxillary, throbbing like mild-to-moderate, non radiated. She says she has had sinus infections in the past with similar presentation and has had h/o \"strep throat\" presenting with URI symptoms but no sore throat in the past. She denies any SOB, wheezing, CP, palpitations, dizziness, N/V/D. Denies any antibiotic allergies. Works as a . ROS  As above included in [facility]. Otherwise 11 point review of systems negative including constitutional, skin, HENT, eyes, respiratory, cardiovascular, gastrointestinal, genitourinary, musculoskeletal, endocrine, hematologic, allergy, and neurologic. Past Medical History  No past medical history on file. No past surgical history on file. Family History  Family History   Problem Relation Age of Onset    Hypertension Mother     Stroke Neg Hx     Cancer Neg Hx     Diabetes Neg Hx     Heart Disease Neg Hx        Social History  She  reports that she has never smoked. She has never used smokeless tobacco.   History   Alcohol Use    Yes     Comment: socially/rarely       Immunization History    There is no immunization history on file for this patient. Allergies  No Known Allergies    Medications  Current Outpatient Prescriptions   Medication Sig    amoxicillin-clavulanate (AUGMENTIN) 875-125 mg per tablet Take 1 Tab by mouth every twelve (12) hours.  loratadine (CLARITIN) 10 mg tablet Take 1 Tab by mouth daily. Indications:  Allergic Rhinitis    norgestimate-ethinyl estradiol (ORTHO-CYCLEN) 0.25-35 mg-mcg tab Take 1 Tab by mouth daily.  ondansetron (ZOFRAN ODT) 4 mg disintegrating tablet Take 1 Tab by mouth every eight (8) hours as needed for Nausea. No current facility-administered medications for this visit. Visit Vitals    /76    Pulse 74    Temp 96.6 °F (35.9 °C) (Oral)    Resp 14    Ht 5' 9.5\" (1.765 m)    Wt 168 lb (76.2 kg)    LMP 06/12/2018 (Exact Date)    SpO2 98%    BMI 24.45 kg/m2     Body mass index is 24.45 kg/(m^2). Physical Exam   Constitutional: She is oriented to person, place, and time and well-developed, well-nourished, and in no distress. HENT:   Head: Normocephalic and atraumatic. Right Ear: External ear normal.   Left Ear: External ear normal.   Mouth/Throat: No oropharyngeal exudate. Enlarged tonsils, R>L, with no clear exudates, but significant congestion, erythema  Postnasal drip- white thick mucus  Nasal congestion   Eyes: EOM are normal. Pupils are equal, round, and reactive to light. Neck: Normal range of motion. Neck supple. Cardiovascular: Normal rate, regular rhythm, normal heart sounds and intact distal pulses. Pulmonary/Chest: Effort normal and breath sounds normal. No respiratory distress. Abdominal: Soft. Bowel sounds are normal.   Musculoskeletal: Normal range of motion. She exhibits no edema. Neurological: She is alert and oriented to person, place, and time. Skin: Skin is warm. No rash noted. No erythema. No pallor. Psychiatric: Mood and affect normal.   Nursing note and vitals reviewed. REVIEW OF DATA    Labs  No visits with results within 1 Month(s) from this visit.   Latest known visit with results is:    Admission on 03/27/2017, Discharged on 03/28/2017   Component Date Value Ref Range Status    Color 03/27/2017 YELLOW    Final    Appearance 03/27/2017 CLEAR    Final    Specific gravity 03/27/2017 1.022  1.005 - 1.030   Final    pH (UA) 03/27/2017 7.0  5.0 - 8.0   Final    Protein 03/27/2017 NEGATIVE   NEG mg/dL Final    Glucose 03/27/2017 NEGATIVE   NEG mg/dL Final    Ketone 03/27/2017 80* NEG mg/dL Final    Bilirubin 03/27/2017 NEGATIVE   NEG   Final    Blood 03/27/2017 SMALL* NEG   Final    Urobilinogen 03/27/2017 1.0  0.2 - 1.0 EU/dL Final    Nitrites 03/27/2017 NEGATIVE   NEG   Final    Leukocyte Esterase 03/27/2017 NEGATIVE   NEG   Final    HCG urine, QL 03/27/2017 NEGATIVE   NEG   Final    Influenza A Antigen 03/27/2017 NEGATIVE   NEG   Final    Influenza B Antigen 03/27/2017 NEGATIVE   NEG   Final    WBC 03/27/2017 0 to 1  0 - 4 /hpf Final    RBC 03/27/2017 11 to 20  0 - 5 /hpf Final    Epithelial cells 03/27/2017 2+  0 - 5 /lpf Final    WBC 03/27/2017 9.1  4.6 - 13.2 K/uL Final    RBC 03/27/2017 4.39  4.20 - 5.30 M/uL Final    HGB 03/27/2017 13.2  12.0 - 16.0 g/dL Final    HCT 03/27/2017 39.2  35.0 - 45.0 % Final    MCV 03/27/2017 89.3  74.0 - 97.0 FL Final    MCH 03/27/2017 30.1  24.0 - 34.0 PG Final    MCHC 03/27/2017 33.7  31.0 - 37.0 g/dL Final    RDW 03/27/2017 12.7  11.6 - 14.5 % Final    PLATELET 50/68/6426 583  135 - 420 K/uL Final    MPV 03/27/2017 9.3  9.2 - 11.8 FL Final    NEUTROPHILS 03/27/2017 90* 40 - 73 % Final    LYMPHOCYTES 03/27/2017 4* 21 - 52 % Final    MONOCYTES 03/27/2017 6  3 - 10 % Final    EOSINOPHILS 03/27/2017 0  0 - 5 % Final    BASOPHILS 03/27/2017 0  0 - 2 % Final    ABS. NEUTROPHILS 03/27/2017 8.2* 1.8 - 8.0 K/UL Final    ABS. LYMPHOCYTES 03/27/2017 0.3* 0.9 - 3.6 K/UL Final    ABS. MONOCYTES 03/27/2017 0.6  0.05 - 1.2 K/UL Final    ABS. EOSINOPHILS 03/27/2017 0.0  0.0 - 0.4 K/UL Final    ABS.  BASOPHILS 03/27/2017 0.0  0.0 - 0.06 K/UL Final    DF 03/27/2017 AUTOMATED    Final    Sodium 03/27/2017 129* 136 - 145 mmol/L Final    Potassium 03/27/2017 3.8  3.5 - 5.5 mmol/L Final    Chloride 03/27/2017 96* 100 - 108 mmol/L Final    CO2 03/27/2017 26  21 - 32 mmol/L Final    Anion gap 03/27/2017 7  3.0 - 18 mmol/L Final  Glucose 03/27/2017 98  74 - 99 mg/dL Final    BUN 03/27/2017 9  7.0 - 18 MG/DL Final    Creatinine 03/27/2017 0.81  0.6 - 1.3 MG/DL Final    BUN/Creatinine ratio 03/27/2017 11* 12 - 20   Final    GFR est AA 03/27/2017 >60  >60 ml/min/1.73m2 Final    GFR est non-AA 03/27/2017 >60  >60 ml/min/1.73m2 Final    Calcium 03/27/2017 9.3  8.5 - 10.1 MG/DL Final    Lipase 03/27/2017 146  73 - 393 U/L Final    Protein, total 03/27/2017 7.7  6.4 - 8.2 g/dL Final    Albumin 03/27/2017 4.1  3.4 - 5.0 g/dL Final    Globulin 03/27/2017 3.6  2.0 - 4.0 g/dL Final    A-G Ratio 03/27/2017 1.1  0.8 - 1.7   Final    Bilirubin, total 03/27/2017 1.1* 0.2 - 1.0 MG/DL Final    Bilirubin, direct 03/27/2017 0.2  0.0 - 0.2 MG/DL Final    Alk. phosphatase 03/27/2017 58  45 - 117 U/L Final    AST (SGOT) 03/27/2017 163* 15 - 37 U/L Final    ALT (SGPT) 03/27/2017 123* 13 - 56 U/L Final         CT Results (most recent):    Results from Hospital Encounter encounter on 08/14/15   CT ABD PELV WO CONT   Narrative Description:  CT abdomen and pelvis without oral or IV contrast    Clinical Indication:  Abdominal pain radiating to the left lower quadrant    Comparison: None. Findings:      CT abdomen:  Lung bases are clear. Solid organ parenchyma is not diagnostically evaluated without IV contrast. No  gross abnormality of the liver, spleen, pancreas or adrenal glands. The kidneys have a normal morphology. No hydronephrosis or mass. The stomach and the duodenum have a normal appearance. No hyperdense gallstones. No peritoneal free fluid or air. No adenopathy. No bowel obstruction. CT pelvis: The appendix appears normal (reference coronal, #[id number]). The bladder is not well-distended but grossly unremarkable. No mass, free fluid or adenopathy is seen within the pelvis. Skeleton/soft tissues:  Within normal limits. Impression Impression:      No etiology for patient's clinical symptoms.  No acute intra-abdominal process. Limited evaluation of solid organs due to lack of IV contrast.           XR Results (most recent):    Results from Hospital Encounter encounter on 11/19/11   XR Michelle Tesfaye   Narrative Ordering MD: Adilson Martinez MD  Signed By: Mitali Saenz MD  ** FINAL **  ---------------------------------------------------------------------  Procedure Date:  11/20/2011   Accession Number:  [id number]          Order No:   [id number]        Procedure:   [facility] - CHEST  1 VIEW       CPT Code:   51314     Admit Diag:   PT FAINTED             Reason:   CHEST PAIN  INTERPRETATION:  CHEST PORTABLE   CPT CODE: 96697  COMPARISON: 10/06/2010. INDICATIONS: Syncope. FINDINGS:   Portable Single View Chest Demonstrates:  Lungs: Clear. Cardiac Silhouette And Mediastinal Contours: Normal.  Pleural Spaces: No pneumothorax or pleural effusion evident. Bones And Soft Tissues: Unremarkable for age. IMPRESSION:  No active or acute cardiopulmonary process is evident. CT   All Micro Results     None           DIAGNOSIS AND PLAN  Patient Active Problem List   Diagnosis Code    Lump or mass in breast N63.0     1. Acute maxillary sinusitis, recurrence not specified  2. Pharyngitis, unspecified etiology  - AMB POC RAPID STREP A neg  - Amoxicillin/clavulanate, 10 day course recommended  - Supportive care with ibuprofen PRN for HA, fever, malaise and loratadine 10 mg PO every day for congestion, rhinorrhea. Return to clinic as needed in case symptoms do not improve or worsen. Follow-up Disposition: Not on File     Keke Vásquez MD

## 2018-06-22 NOTE — PROGRESS NOTES
1. Have you been to the ER, urgent care clinic or hospitalized since your last visit? NO.     2. Have you seen or consulted any other health care providers outside of the [address] [place] [address] since your last visit (Include any pap smears or colon screening)? NO      Do you have an Advanced Directive? NO    Would you like information on Advanced Directives? NO    Chief Complaint   Patient presents with    Headache     Pt stated having a headache since 4pm yesterday. Pt stated pain at sinuses. drainage started this morning.

## 2018-10-24 ENCOUNTER — OFFICE VISIT (OUTPATIENT)
Dept: INTERNAL MEDICINE CLINIC | Age: 23
End: 2018-10-24

## 2018-10-24 VITALS
HEART RATE: 66 BPM | RESPIRATION RATE: 14 BRPM | HEIGHT: 70 IN | TEMPERATURE: 98 F | DIASTOLIC BLOOD PRESSURE: 70 MMHG | OXYGEN SATURATION: 99 % | SYSTOLIC BLOOD PRESSURE: 110 MMHG | BODY MASS INDEX: 23.85 KG/M2 | WEIGHT: 166.6 LBS

## 2018-10-24 DIAGNOSIS — B34.0 ADENOVIRUS INFECTION, UNSPECIFIED: Primary | ICD-10-CM

## 2018-10-24 NOTE — PATIENT INSTRUCTIONS
Symptomatic Treatment:     Make sure you are staying hydrated, 6-8 glasses of water daily, warm teas with honey to soothe a sore throat, throat lozenges, cool mist humidifier    Over the counter medications:     Nasal saline rinses followed by Flonase or Nasacort 1 puff to each side of the nose to be done daily prior to bedtime to help decreased nasal congestion and post nasal drip. Mucinex daily to help loosen chest and nasal congestion and needs to be taken daily with good hydration to provide relief. Antihistamines (Claritin, Zyrtec, Allegra) help dry up congestion and decrease watery or itchy eyes, ear fullness if related to noninfectious fluid behind the ear drum, and itchy ears. Sudafed- helps with congestion and cough. This medication does contain phenylephrine so be cautious while taking this medication if you have a diagnosis of elevated blood pressure.      Tylenol, Naproxsyn, Aleve can be used to help relieve pain/tenderness/headaches/fever

## 2018-10-24 NOTE — PROGRESS NOTES
1. Have you been to the ER, urgent care clinic or hospitalized since your last visit? NO.     2. Have you seen or consulted any other health care providers outside of the [address] [place] [address] since your last visit (Include any pap smears or colon screening)? NO      Do you have an Advanced Directive? NO    Would you like information on Advanced Directives?  NO

## 2018-10-24 NOTE — PROGRESS NOTES
Danni Carter is a 26 y.o.  female and presents with    Chief Complaint   Patient presents with    Cold Symptoms     cough, congestion, sinus pain and pressure, post nasal drainage, started 10/21/18       Subjective:  HPI  Ms. El presents today with productive cough thick green mucous, nasal congestion and chest congestion, pressure maxillary and frontal sinus region, post nasal drip, swelling of throat with mild soreness. States felt warm last night, did not check temperature. Denies fever, chills, gi symptoms. Traveled to [place] by plane returned Monday night. One of the people she travelled with with similar symptoms. She has been taking Ibuprofen for pressure/headaches with relief, took decongestant before getting on the plane with some relief but has not been using since. [address] [facility]. Denies seasonal allergies, asthma, she is a nonsmoker. She reports gets this once a year. Reports symptoms started Sunday morning x 4 days. Additional Concerns: none     ROS   Review of Systems   Constitutional: Positive for malaise/fatigue. Negative for chills and fever. HENT: Positive for congestion, sinus pain and sore throat. Negative for ear pain and tinnitus. Eyes: Negative. Respiratory: Positive for cough. Negative for hemoptysis, sputum production, shortness of breath and wheezing. Cardiovascular: Negative. Gastrointestinal: Negative. Genitourinary: Negative. Musculoskeletal: Negative. Skin: Negative. Neurological: Positive for headaches. Psychiatric/Behavioral: Negative. No Known Allergies    Current Outpatient Medications   Medication Sig Dispense Refill    norgestimate-ethinyl estradiol (ORTHO-CYCLEN) 0.25-35 mg-mcg tab Take 1 Tab by mouth daily.  loratadine (CLARITIN) 10 mg tablet Take 1 Tab by mouth daily. Indications:  Allergic Rhinitis 30 Tab 0       Social History     Socioeconomic History    Marital status: SINGLE     Spouse name: Not on file    Number of children: Not on file    Years of education: Not on file    Highest education level: Not on file   Social Needs    Financial resource strain: Not on file    Food insecurity - worry: Not on file    Food insecurity - inability: Not on file    Transportation needs - medical: Not on file   [place] needs - non-medical: Not on file   Occupational History    Not on file   Tobacco Use    Smoking status: Never Smoker    Smokeless tobacco: Never Used   Substance and Sexual Activity    Alcohol use: Yes     Comment: socially/rarely    Drug use: No    Sexual activity: Yes     Birth control/protection: Pill   Other Topics Concern    Not on file   Social History Narrative    Not on file       No past medical history on file. No past surgical history on file. Family History   Problem Relation Age of Onset    Hypertension Mother     Stroke Neg Hx     Cancer Neg Hx     Diabetes Neg Hx     Heart Disease Neg Hx        Objective:  Vitals:    10/24/18 1504   BP: 110/70   Pulse: 66   Resp: 14   Temp: 98 °F (36.7 °C)   TempSrc: Oral   SpO2: 99%   Weight: 166 lb 9.6 oz (75.6 kg)   Height: 5' 9.5\" (1.765 m)   PainSc:   8   PainLoc: Head       LABS   Results for orders placed or performed in visit on 06/22/18   AMB POC RAPID STREP A   Result Value Ref Range    VALID INTERNAL CONTROL POC Yes     Group A Strep Ag Negative Negative       TESTS  none    PE  Physical Exam   Constitutional: She is oriented to person, place, and time. She appears well-developed and well-nourished. No distress. HENT:   Head: Normocephalic and atraumatic. Mouth/Throat: No oropharyngeal exudate. OME bilateral, TM visible  Cobblestone appearance to oropharynx  Edematous left nare, no drainage or bogginess noted   Eyes: EOM are normal. Pupils are equal, round, and reactive to light. Right conjunctiva is injected. Left conjunctiva is injected. Neck: Normal range of motion.    Cardiovascular: Normal rate, regular rhythm, normal heart sounds and intact distal pulses. Pulmonary/Chest: Effort normal and breath sounds normal. No respiratory distress. She has no wheezes. She has no rales. She exhibits no tenderness. No coughing noted entire examination   Abdominal: Soft. Bowel sounds are normal.   Musculoskeletal: Normal range of motion. Lymphadenopathy:     She has cervical adenopathy. Neurological: She is alert and oriented to person, place, and time. Skin: Skin is warm and dry. No rash noted. She is not diaphoretic. No erythema. No pallor. Psychiatric: She has a normal mood and affect. Her behavior is normal. Judgment and thought content normal.   Vitals reviewed. Assessment/Plan:    1. Adenovirus- Letter provided for work. Symptomatic Treatment:     Make sure you are staying hydrated, 6-8 glasses of water daily, warm teas with honey to soothe a sore throat, throat lozenges, cool mist humidifier    Over the counter medications:     Nasal saline rinses followed by Flonase or Nasacort 1 puff to each side of the nose to be done daily prior to bedtime to help decreased nasal congestion and post nasal drip. Mucinex daily to help loosen chest and nasal congestion and needs to be taken daily with good hydration to provide relief. Antihistamines (Claritin, Zyrtec, Allegra) help dry up congestion and decrease watery or itchy eyes, ear fullness if related to noninfectious fluid behind the ear drum, and itchy ears. Sudafed- helps with congestion and cough. This medication does contain phenylephrine so be cautious while taking this medication if you have a diagnosis of elevated blood pressure. Tylenol, Naproxsyn, Aleve can be used to help relieve pain/tenderness/headaches/fever    Lab review: no lab studies available for review at time of visit    Discussed s/s of bacterial sinusitis and to return if symptoms worsen or present. Today's Visit:   Diagnoses and all orders for this visit:    1.  Adenovirus infection, unspecified      Health Maintenance: Deferred to PCP. I have discussed the diagnosis with the patient and the intended plan as seen in the above orders. The patient has received an after-visit summary and questions were answered concerning future plans. I have discussed medication side effects and warnings with the patient as well. I have reviewed the plan of care with the patient, accepted their input and they are in agreement with the treatment goals. Follow-up Disposition: Not on File   More than 1/2 of this 15 minute visit was spent in counseling and coordination of care, as described above.     ENRIQUE Huffman  Internist of [address], [address] [place] [address].  Phone: [phone]  Fax: [phone]

## 2018-10-24 NOTE — LETTER
NOTIFICATION RETURN TO WORK / SCHOOL 
 
10/24/2018 3:35 PM 
 
Ms. Flavio Caputo [address] [facility] [address] To Whom It May Concern: 
 
Flavio Caputo is currently under the care of Mary Cardona. She will return to work/school on: 10/26/18 If there are questions or concerns please have the patient contact our office. Sincerely, Barbara Goodrich NP

## 2019-01-18 ENCOUNTER — HOSPITAL ENCOUNTER (EMERGENCY)
Age: 24
Discharge: HOME | End: 2019-01-18
Attending: EMERGENCY MEDICINE | Admitting: EMERGENCY MEDICINE
Payer: COMMERCIAL

## 2019-01-18 VITALS
DIASTOLIC BLOOD PRESSURE: 66 MMHG | RESPIRATION RATE: 16 BRPM | HEART RATE: 83 BPM | TEMPERATURE: 97.9 F | SYSTOLIC BLOOD PRESSURE: 116 MMHG | OXYGEN SATURATION: 100 %

## 2019-01-18 DIAGNOSIS — R55 SYNCOPE AND COLLAPSE: Primary | ICD-10-CM

## 2019-01-18 LAB
ANION GAP SERPL CALC-SCNC: 11 MMOL/L (ref 3–18)
APPEARANCE UR: CLEAR
ATRIAL RATE: 79 BPM
BACTERIA URNS QL MICRO: NEGATIVE /HPF
BASOPHILS # BLD: 0 K/UL (ref 0–0.1)
BASOPHILS NFR BLD: 0 % (ref 0–2)
BILIRUB UR QL: NEGATIVE
BUN SERPL-MCNC: 11 MG/DL (ref 7–18)
BUN/CREAT SERPL: 16 (ref 12–20)
CALCIUM SERPL-MCNC: 9 MG/DL (ref 8.5–10.1)
CALCULATED P AXIS, ECG09: 26 DEGREES
CALCULATED R AXIS, ECG10: 67 DEGREES
CALCULATED T AXIS, ECG11: 45 DEGREES
CHLORIDE SERPL-SCNC: 101 MMOL/L (ref 100–108)
CO2 SERPL-SCNC: 27 MMOL/L (ref 21–32)
COLOR UR: YELLOW
CREAT SERPL-MCNC: 0.7 MG/DL (ref 0.6–1.3)
DIAGNOSIS, 93000: NORMAL
DIFFERENTIAL METHOD BLD: ABNORMAL
EOSINOPHIL # BLD: 0.2 K/UL (ref 0–0.4)
EOSINOPHIL NFR BLD: 1 % (ref 0–5)
EPITH CASTS URNS QL MICRO: NORMAL /LPF (ref 0–5)
ERYTHROCYTE [DISTWIDTH] IN BLOOD BY AUTOMATED COUNT: 12.4 % (ref 11.6–14.5)
GLUCOSE SERPL-MCNC: 97 MG/DL (ref 74–99)
GLUCOSE UR STRIP.AUTO-MCNC: NEGATIVE MG/DL
HCG UR QL: NEGATIVE
HCT VFR BLD AUTO: 38.2 % (ref 35–45)
HGB BLD-MCNC: 12.8 G/DL (ref 12–16)
HGB UR QL STRIP: NEGATIVE
KETONES UR QL STRIP.AUTO: 40 MG/DL
LEUKOCYTE ESTERASE UR QL STRIP.AUTO: ABNORMAL
LYMPHOCYTES # BLD: 0.7 K/UL (ref 0.9–3.6)
LYMPHOCYTES NFR BLD: 5 % (ref 21–52)
MCH RBC QN AUTO: 30.3 PG (ref 24–34)
MCHC RBC AUTO-ENTMCNC: 33.5 G/DL (ref 31–37)
MCV RBC AUTO: 90.5 FL (ref 74–97)
MONOCYTES # BLD: 0.7 K/UL (ref 0.05–1.2)
MONOCYTES NFR BLD: 6 % (ref 3–10)
NEUTS SEG # BLD: 10.9 K/UL (ref 1.8–8)
NEUTS SEG NFR BLD: 88 % (ref 40–73)
NITRITE UR QL STRIP.AUTO: NEGATIVE
P-R INTERVAL, ECG05: 128 MS
PH UR STRIP: 7 [PH] (ref 5–8)
PLATELET # BLD AUTO: 302 K/UL (ref 135–420)
PMV BLD AUTO: 9.1 FL (ref 9.2–11.8)
POTASSIUM SERPL-SCNC: 3.6 MMOL/L (ref 3.5–5.5)
PROT UR STRIP-MCNC: ABNORMAL MG/DL
Q-T INTERVAL, ECG07: 380 MS
QRS DURATION, ECG06: 82 MS
QTC CALCULATION (BEZET), ECG08: 435 MS
RBC # BLD AUTO: 4.22 M/UL (ref 4.2–5.3)
RBC #/AREA URNS HPF: NORMAL /HPF (ref 0–5)
SODIUM SERPL-SCNC: 139 MMOL/L (ref 136–145)
SP GR UR REFRACTOMETRY: 1.02 (ref 1–1.03)
UROBILINOGEN UR QL STRIP.AUTO: 1 EU/DL (ref 0.2–1)
VENTRICULAR RATE, ECG03: 79 BPM
WBC # BLD AUTO: 12.5 K/UL (ref 4.6–13.2)
WBC URNS QL MICRO: NORMAL /HPF (ref 0–4)

## 2019-01-18 PROCEDURE — 99284 EMERGENCY DEPT VISIT MOD MDM: CPT

## 2019-01-18 PROCEDURE — 74011250636 HC RX REV CODE- 250/636: Performed by: PHYSICIAN ASSISTANT

## 2019-01-18 PROCEDURE — 96360 HYDRATION IV INFUSION INIT: CPT

## 2019-01-18 PROCEDURE — 85025 COMPLETE CBC W/AUTO DIFF WBC: CPT

## 2019-01-18 PROCEDURE — 96361 HYDRATE IV INFUSION ADD-ON: CPT

## 2019-01-18 PROCEDURE — 81025 URINE PREGNANCY TEST: CPT

## 2019-01-18 PROCEDURE — 99283 EMERGENCY DEPT VISIT LOW MDM: CPT

## 2019-01-18 PROCEDURE — 80048 BASIC METABOLIC PNL TOTAL CA: CPT

## 2019-01-18 PROCEDURE — 81001 URINALYSIS AUTO W/SCOPE: CPT

## 2019-01-18 PROCEDURE — 93005 ELECTROCARDIOGRAM TRACING: CPT

## 2019-01-18 RX ORDER — NORETHINDRONE ACETATE AND ETHINYL ESTRADIOL 1.5-30(21)
1 KIT ORAL DAILY
COMMUNITY

## 2019-01-18 RX ADMIN — SODIUM CHLORIDE 1000 ML: 900 INJECTION, SOLUTION INTRAVENOUS at 16:12

## 2019-01-18 NOTE — DISCHARGE INSTRUCTIONS
Patient Education        Vasovagal Syncope: Care Instructions  Your Care Instructions    Vasovagal syncope (say \"gyy-nws-FPLShayy BREWER-kuh-pee\")is sudden dizziness or fainting that can be set off by things such as pain, stress, fear, or trauma. You may sweat or feel lightheaded, sick to your stomach, or tingly. The problem causes the heart rate to slow and the blood vessels to widen, or dilate, for a short time. When this happens, blood pools in the lower body, and less blood goes to the brain. You can usually get relief by lying down with your legs raised (elevated). This helps more blood to flow to your brain and may help relieve symptoms like feeling dizzy. Some doctors may recommend a technique that involves tensing your fists and arms. This type of fainting is often easy to predict. For example, it happens to some people when they see blood or have to get a shot. They may feel symptoms before they faint. An episode of vasovagal syncope usually responds well to self-care. Other treatment often isn't needed. But if the fainting keeps happening, your doctor may suggest further treatments. Follow-up care is a key part of your treatment and safety. Be sure to make and go to all appointments, and call your doctor if you are having problems. It's also a good idea to know your test results and keep a list of the medicines you take. How can you care for yourself at home? · Drink plenty of fluids to prevent dehydration. If you have kidney, heart, or liver disease and have to limit fluids, talk with your doctor before you increase your fluid intake. · Try to avoid things that you think may set off vasovagal syncope. · Talk to your doctor about any medicines you take. Some medicines may increase the chance of this condition occurring. · If you feel symptoms, lie down with your legs raised. Talk to your doctor about what to do if your symptoms come back. When should you call for help?   Call 911 anytime you think you may need emergency care. For example, call if:    · You have symptoms of a heart problem. These may include:  ? Chest pain or pressure. ? Severe trouble breathing. ? A fast or irregular heartbeat.    Watch closely for changes in your health, and be sure to contact your doctor if:    · You have more episodes of fainting at home.     · You do not get better as expected. Where can you learn more? Go to http://dionne-cezar.info/. Enter L754 in the search box to learn more about \"Vasovagal Syncope: Care Instructions. \"  Current as of: September 23, 2018  Content Version: 11.9  © 9843-1590 [employer]. Care instructions adapted under license by [employer] (which disclaims liability or warranty for this information). If you have questions about a medical condition or this instruction, always ask your healthcare professional. [address] any warranty or liability for your use of this information.

## 2019-01-18 NOTE — ED NOTES
Erick Lizarraga is a 26 y.o. female that was discharged in stable condition. The patients diagnosis, condition and treatment were explained to  patient and aftercare instructions were given. The patient verbalized understanding. Patient armband removed and shredded.

## 2019-01-18 NOTE — ED TRIAGE NOTES
Patient c/o passing out while at the . Patient felt hot then passed out patient denies pregnancy. Patient walked in with not problem.

## 2019-02-01 ENCOUNTER — OFFICE VISIT (OUTPATIENT)
Dept: INTERNAL MEDICINE CLINIC | Age: 24
End: 2019-02-01

## 2019-02-01 VITALS
TEMPERATURE: 98.1 F | WEIGHT: 166.4 LBS | HEIGHT: 70 IN | DIASTOLIC BLOOD PRESSURE: 76 MMHG | RESPIRATION RATE: 20 BRPM | HEART RATE: 77 BPM | SYSTOLIC BLOOD PRESSURE: 117 MMHG | BODY MASS INDEX: 23.82 KG/M2 | OXYGEN SATURATION: 98 %

## 2019-02-01 DIAGNOSIS — R55 SYNCOPE AND COLLAPSE: Primary | ICD-10-CM

## 2019-02-01 NOTE — PROGRESS NOTES
Safia Caraballo presents today for Chief Complaint Patient presents with  ED Follow-up ER F/U Seen at [facility] on 1-18-19 for syncope and collapse. Patient states that she collapsed after using the bathroom. Patient denies any constipation. Patient states that a couple weeks prior she felt lightheaded  Head Pain  
  intermittent head pain since the fall on 1-18-19. Coordination of Care: 1. Have you been to the ER, urgent care clinic since your last visit? Hospitalized since your last visit? YES 
 
2. Have you seen or consulted any other health care providers outside of the [address] [place] [address] since your last visit? Include any pap smears or colon screening.  NO

## 2019-02-01 NOTE — PROGRESS NOTES
ABRAHAM March is a 26 y.o. female with no relevant PMH, here after recent ED visit on 1/18, for syncopal episode. The patient tells me she her hair done, when she stood up to go to the bathroom, use the toilet, without any difficulty, and upon coming out of the bathroom, moved her head looking backwards, and was noted by a friend to be pale and having dark bluish discoloration of her lips, found a few seconds later in the bathroom floor unconscious, lasting only a few seconds, no abnormal movements, or superficial skin injuries noted, no salivation, no confusion noted. The patient only recalls feeling a little hot sensation, before losing consciousness, and then waking up in the restroom floor. She denied preceeding shortness of breath, vertigo, headache, malaise, chest pain, palpitations. She tells me she had an episode of pain in the past, about 10 years ago, while at the movies, her friend caught her before she hit the floor, she lost consciousness while on the bathroom, for a few seconds, that time, she thinks she was a little bit dehydrated and that her blood sugar was possibly low. She denies fasting prior to the most recent episode, she denied being on any special diets, having any special emotional stressors in her life recently, in the emergency and EKG, and blood work including CBC, UA, basic metabolic, pregnancy test were all unremarkable. Since the episode, the patient has had a little bit of headache, in the coronal aspect, where she thinks she might of gotten injured after the fall, feels a little sore locally at the touch, but denies any significant pain, dizziness, confusion, chest pain, shortness of breath, palpitations, or any other symptoms. ROS As above included in HPI.  
Otherwise 11 point review of systems negative including constitutional, skin, HENT, eyes, respiratory, cardiovascular, gastrointestinal, genitourinary, musculoskeletal, endocrine, hematologic, allergy, and neurologic. Past Medical History No past medical history on file. No past surgical history on file. Family History Family History Problem Relation Age of Onset  Hypertension Mother  Stroke Neg Hx  Cancer Neg Hx  Diabetes Neg Hx   
 Heart Disease Neg Hx Social History She  reports that  has never smoked. she has never used smokeless tobacco.  
Social History Substance and Sexual Activity Alcohol Use Yes Comment: socially/rarely Immunization History There is no immunization history on file for this patient. Allergies No Known Allergies Medications Current Outpatient Medications Medication Sig  
 norethindrone-ethinyl estradiol-iron (JUNEL FE 1.5/30, 28,) 1.5 mg-30 mcg (21)/75 mg (7) tab Take 1 Tab by mouth daily. No current facility-administered medications for this visit. Visit Vitals /76 Pulse 77 Temp 98.1 °F (36.7 °C) (Oral) Resp 20 Ht 5' 9.5\" (1.765 m) Wt 166 lb 6.4 oz (75.5 kg) LMP 01/15/2019 SpO2 98% BMI 24.22 kg/m² Body mass index is 24.22 kg/m². Physical Exam  
Constitutional: She is oriented to person, place, and time and well-developed, well-nourished, and in no distress. HENT:  
Head: Normocephalic and atraumatic. Right Ear: External ear normal.  
Left Ear: External ear normal.  
Eyes: Conjunctivae are normal. Pupils are equal, round, and reactive to light. Cardiovascular: Normal rate. Pulmonary/Chest: Effort normal and breath sounds normal.  
Abdominal: Soft. Musculoskeletal: Normal range of motion. She exhibits no edema. Neurological: She is alert and oriented to person, place, and time. She displays normal reflexes. No cranial nerve deficit. She exhibits normal muscle tone. Gait normal. Coordination normal.  
Skin: Skin is warm. Psychiatric: Mood and affect normal.  
 
 
 
REVIEW OF DATA Labs Admission on 01/18/2019, Discharged on 01/18/2019 Component Date Value Ref Range Status  Ventricular Rate 01/18/2019 79  BPM Final  
 Atrial Rate 01/18/2019 79  BPM Final  
 P-R Interval 01/18/2019 128  ms Final  
 QRS Duration 01/18/2019 82  ms Final  
 Q-T Interval 01/18/2019 380  ms Final  
 QTC Calculation (Bezet) 01/18/2019 435  ms Final  
 Calculated P Axis 01/18/2019 26  degrees Final  
 Calculated R Axis 01/18/2019 67  degrees Final  
 Calculated T Axis 01/18/2019 45  degrees Final  
 Diagnosis 01/18/2019    Final  
                 Value:Normal sinus rhythm with sinus arrhythmia Normal ECG When compared with ECG of 25-JUL-2016 12:16, No significant change was found Confirmed by Mckinley Goldberg MD, Francis Exon ([id number]) on 1/18/2019 4:41:43 PM 
  
 WBC 01/18/2019 12.5  4.6 - 13.2 K/uL Final  
 RBC 01/18/2019 4.22  4.20 - 5.30 M/uL Final  
 HGB 01/18/2019 12.8  12.0 - 16.0 g/dL Final  
 HCT 01/18/2019 38.2  35.0 - 45.0 % Final  
 MCV 01/18/2019 90.5  74.0 - 97.0 FL Final  
 MCH 01/18/2019 30.3  24.0 - 34.0 PG Final  
 MCHC 01/18/2019 33.5  31.0 - 37.0 g/dL Final  
 RDW 01/18/2019 12.4  11.6 - 14.5 % Final  
 PLATELET 94/17/8661 146  135 - 420 K/uL Final  
 MPV 01/18/2019 9.1* 9.2 - 11.8 FL Final  
 NEUTROPHILS 01/18/2019 88* 40 - 73 % Final  
 LYMPHOCYTES 01/18/2019 5* 21 - 52 % Final  
 MONOCYTES 01/18/2019 6  3 - 10 % Final  
 EOSINOPHILS 01/18/2019 1  0 - 5 % Final  
 BASOPHILS 01/18/2019 0  0 - 2 % Final  
 ABS. NEUTROPHILS 01/18/2019 10.9* 1.8 - 8.0 K/UL Final  
 ABS. LYMPHOCYTES 01/18/2019 0.7* 0.9 - 3.6 K/UL Final  
 ABS. MONOCYTES 01/18/2019 0.7  0.05 - 1.2 K/UL Final  
 ABS. EOSINOPHILS 01/18/2019 0.2  0.0 - 0.4 K/UL Final  
 ABS.  BASOPHILS 01/18/2019 0.0  0.0 - 0.1 K/UL Final  
 DF 01/18/2019 AUTOMATED    Final  
 Sodium 01/18/2019 139  136 - 145 mmol/L Final  
 Potassium 01/18/2019 3.6  3.5 - 5.5 mmol/L Final  
 Chloride 01/18/2019 101  100 - 108 mmol/L Final  
 CO2 01/18/2019 27  21 - 32 mmol/L Final  
  Anion gap 01/18/2019 11  3.0 - 18 mmol/L Final  
 Glucose 01/18/2019 97  74 - 99 mg/dL Final  
 BUN 01/18/2019 11  7.0 - 18 MG/DL Final  
 Creatinine 01/18/2019 0.70  0.6 - 1.3 MG/DL Final  
 BUN/Creatinine ratio 01/18/2019 16  12 - 20   Final  
 GFR est AA 01/18/2019 >60  >60 ml/min/1.73m2 Final  
 GFR est non-AA 01/18/2019 >60  >60 ml/min/1.73m2 Final  
 Calcium 01/18/2019 9.0  8.5 - 10.1 MG/DL Final  
 Color 01/18/2019 YELLOW    Final  
 Appearance 01/18/2019 CLEAR    Final  
 Specific gravity 01/18/2019 1.023  1.005 - 1.030   Final  
 pH (UA) 01/18/2019 7.0  5.0 - 8.0   Final  
 Protein 01/18/2019 TRACE* NEG mg/dL Final  
 Glucose 01/18/2019 NEGATIVE   NEG mg/dL Final  
 Ketone 01/18/2019 40* NEG mg/dL Final  
 Bilirubin 01/18/2019 NEGATIVE   NEG   Final  
 Blood 01/18/2019 NEGATIVE   NEG   Final  
 Urobilinogen 01/18/2019 1.0  0.2 - 1.0 EU/dL Final  
 Nitrites 01/18/2019 NEGATIVE   NEG   Final  
 Leukocyte Esterase 01/18/2019 TRACE* NEG   Final  
 HCG urine, QL 01/18/2019 NEGATIVE   NEG   Final  
 WBC 01/18/2019 0 to 3  0 - 4 /hpf Final  
 RBC 01/18/2019 NONE  0 - 5 /hpf Final  
 Epithelial cells 01/18/2019 1+  0 - 5 /lpf Final  
 Bacteria 01/18/2019 NEGATIVE   NEG /hpf Final  
 
 
 
CT Results (most recent): 
Results from Hospital Encounter encounter on 08/14/15 CT ABD PELV WO CONT Narrative Description:  CT abdomen and pelvis without oral or IV contrast 
 
Clinical Indication:  Abdominal pain radiating to the left lower quadrant Comparison: None. Findings:   
 
CT abdomen: 
Lung bases are clear. Solid organ parenchyma is not diagnostically evaluated without IV contrast. No 
gross abnormality of the liver, spleen, pancreas or adrenal glands. The kidneys have a normal morphology. No hydronephrosis or mass. The stomach and the duodenum have a normal appearance. No hyperdense gallstones. No peritoneal free fluid or air. No adenopathy. No bowel obstruction. CT pelvis: The appendix appears normal (reference coronal, #[id number]). The bladder is not well-distended but grossly unremarkable. No mass, free fluid or adenopathy is seen within the pelvis. Skeleton/soft tissues: 
Within normal limits. Impression Impression: No etiology for patient's clinical symptoms. No acute intra-abdominal process. Limited evaluation of solid organs due to lack of IV contrast. 
  
  
 
XR Results (most recent): 
Results from Hospital Encounter encounter on 11/19/11 XR CHEST SINGLE VIEW Narrative Ordering MD: Gabbie Cevallos MD 
Signed By: Cheng Thakur MD 
** FINAL ** 
--------------------------------------------------------------------- Procedure Date:  11/20/2011   Accession Number:  [id number] Order No:   [id number] Procedure:   [facility] - CHEST  1 VIEW 
      CPT Code:   49336 Admit Diag:   PT FAINTED Reason:   CHEST PAIN 
INTERPRETATION: 
CHEST PORTABLE  
CPT CODE: 28856 COMPARISON: 10/06/2010. INDICATIONS: Syncope. FINDINGS:  
Portable Single View Chest Demonstrates: 
Lungs: Clear. Cardiac Silhouette And Mediastinal Contours: Normal. 
Pleural Spaces: No pneumothorax or pleural effusion evident. Bones And Soft Tissues: Unremarkable for age. IMPRESSION: 
No active or acute cardiopulmonary process is evident. CT All Micro Results None DIAGNOSIS AND PLAN Patient Active Problem List  
Diagnosis Code  Lump or mass in breast N63.0 1. Syncope and collapse Likely vasovagal, however will check fasting glucose and thyroid function tests, and depending on symptoms moving forward may need further cardiac or neurologic work up. - METABOLIC PANEL, BASIC; Future 
- TSH 3RD GENERATION; Future - T4, FREE; Future - GLUCOSE, FASTING; Future Follow-up Disposition: 
Return in about 6 months (around 8/1/2019). Keke White MD

## 2019-02-08 ENCOUNTER — HOSPITAL ENCOUNTER (OUTPATIENT)
Dept: LAB | Age: 24
Discharge: HOME | End: 2019-02-08
Payer: COMMERCIAL

## 2019-02-08 ENCOUNTER — APPOINTMENT (OUTPATIENT)
Dept: INTERNAL MEDICINE CLINIC | Age: 24
End: 2019-02-08

## 2019-02-08 DIAGNOSIS — R55 SYNCOPE AND COLLAPSE: ICD-10-CM

## 2019-02-08 LAB
ANION GAP SERPL CALC-SCNC: 6 MMOL/L (ref 3–18)
BUN SERPL-MCNC: 10 MG/DL (ref 7–18)
BUN/CREAT SERPL: 12 (ref 12–20)
CALCIUM SERPL-MCNC: 8.9 MG/DL (ref 8.5–10.1)
CHLORIDE SERPL-SCNC: 105 MMOL/L (ref 100–108)
CO2 SERPL-SCNC: 28 MMOL/L (ref 21–32)
CREAT SERPL-MCNC: 0.82 MG/DL (ref 0.6–1.3)
GLUCOSE P FAST SERPL-MCNC: 85 MG/DL (ref 74–106)
GLUCOSE SERPL-MCNC: 85 MG/DL (ref 74–99)
POTASSIUM SERPL-SCNC: 4 MMOL/L (ref 3.5–5.5)
SODIUM SERPL-SCNC: 139 MMOL/L (ref 136–145)
T4 FREE SERPL-MCNC: 1.2 NG/DL (ref 0.7–1.5)
TSH SERPL DL<=0.05 MIU/L-ACNC: 1.1 UIU/ML (ref 0.36–3.74)

## 2019-02-08 PROCEDURE — 80048 BASIC METABOLIC PNL TOTAL CA: CPT

## 2019-02-08 PROCEDURE — 84439 ASSAY OF FREE THYROXINE: CPT

## 2019-02-08 PROCEDURE — 36415 COLL VENOUS BLD VENIPUNCTURE: CPT

## 2019-02-08 PROCEDURE — 84443 ASSAY THYROID STIM HORMONE: CPT

## 2019-02-08 PROCEDURE — 82947 ASSAY GLUCOSE BLOOD QUANT: CPT

## 2019-02-12 ENCOUNTER — TELEPHONE (OUTPATIENT)
Dept: INTERNAL MEDICINE CLINIC | Age: 24
End: 2019-02-12

## 2019-02-12 NOTE — TELEPHONE ENCOUNTER
Please let her know all her labs including fasting glucose, renal function and electrolytes and thyroid function were unremarkable. Recent episode of syncope most likely vaso-vagal, no need for further work up at this time.  Thanks

## 2019-02-13 DIAGNOSIS — R42 DIZZINESS: ICD-10-CM

## 2019-02-13 DIAGNOSIS — R55 SYNCOPE, UNSPECIFIED SYNCOPE TYPE: Primary | ICD-10-CM

## 2019-02-13 NOTE — TELEPHONE ENCOUNTER
Please let her know if she would like, we can refer her to cardiology for further recommendations and evaluation.  I will drop the referral. Thanks

## 2019-02-13 NOTE — TELEPHONE ENCOUNTER
[address], all labs looked good including blood sugar, kidney function, thyroid function, and her electrolytes. Dr. Yary Echeverria says there's no need for further work up at this time.

## 2019-02-13 NOTE — TELEPHONE ENCOUNTER
Spoke with patient, given results below. She doesn't believe this was a vaso-vagal response because she is still very light-headed daily. She said that at times she doesn't feel comfortable even driving and she's working through these symptoms, but it's been difficult because she's concerned with what's going on with her body. She wants to know what she should do next?

## 2019-02-20 NOTE — TELEPHONE ENCOUNTER
This message was sent to myself only while out of the office for several days. Patient has since been scheduled with Cardiology so I will close this encounter.

## 2019-03-06 ENCOUNTER — TELEPHONE (OUTPATIENT)
Dept: INTERNAL MEDICINE CLINIC | Age: 24
End: 2019-03-06

## 2019-03-06 NOTE — TELEPHONE ENCOUNTER
Alysa Farmer-- Dr Araceli Mckinney sent her to cardiology because the patient complained she was still dizzy, etc and Dr Araceli Mckinney wanted a 2nd opinion. The message was sent only to [address] [place] [address] St doesn't work daily so the pt was not notified.

## 2019-03-06 NOTE — TELEPHONE ENCOUNTER
PT was told labs were fine- but now she has a cardiology appt- she doesn't understand why- she would like to speak with  someone

## 2019-03-06 NOTE — TELEPHONE ENCOUNTER
Tried to call again, LM again  Her labs that were checked were glucose, thyroid levels, all electrolytes, kidney/liver function, complete blood count (check for anemia and any abnormalities of blood cells

## 2019-03-07 NOTE — TELEPHONE ENCOUNTER
Tried to call again, left another message, I left a detailed message with what labs she had checked.

## 2019-03-20 ENCOUNTER — OFFICE VISIT (OUTPATIENT)
Dept: CARDIOLOGY CLINIC | Age: 24
End: 2019-03-20

## 2019-03-20 VITALS
DIASTOLIC BLOOD PRESSURE: 70 MMHG | BODY MASS INDEX: 23.34 KG/M2 | HEIGHT: 70 IN | HEART RATE: 70 BPM | OXYGEN SATURATION: 98 % | SYSTOLIC BLOOD PRESSURE: 110 MMHG | WEIGHT: 163 LBS

## 2019-03-20 DIAGNOSIS — R55 SYNCOPE, UNSPECIFIED SYNCOPE TYPE: Primary | ICD-10-CM

## 2019-03-20 NOTE — PROGRESS NOTES
Zaynab Manning    Chief Complaint   Patient presents with    New Patient     referred by PCP for syncope     Dizziness     sometimes    Palpitations     racing and fluttering       HPI    Zaynab Manning is a 26 y.o. female with no known heart disease, here for evaluation of syncope and palpitations. As you know patient was in her regular state of health when on her 18th 2019 she had a syncopal episode. She was apparently having her hair done at a friend's house she was noted to be pale and blue. She was feeling very hot somewhat unwell so she got up to use the restroom. She apparently passed out while in the bathroom and hit her head on the floor. It had otherwise been a normal day prior to this and she does not recall any surrounding chest pain palpitations or shortness of breath. She was found soon after falling and there were no witnessed bowel or bladder incontinence or seizure-like activity. Personally obtained and reviewed her ER visit and discussed it with her today. Currently she has passed out one time in the past but this was more than 10 years ago. The ER she had an EKG and blood work done that was unremarkable and asked to follow-up as an outpatient. Again she has no known chronic medical problems no known congenital heart disease was never told she had a heart murmur. Her family history is negative for significant sudden cardiac death or premature coronary artery disease. But she does say that she has atrial fibrillation that runs on both sides of the family. She did recently start a new job may be causing her more stress. She used to exercise quite regularly and was doing some type of modified CrossFit with aerobic exercise and mild lifting. Does not exercise since that she is afraid that this could happen again.   In particular she says she is having palpitation she feels like her heart races when she is just at rest or can raise too quickly if she stands up too fast.  In addition she says she is getting winded very easily from walking upstairs. She was exercising she was never stopped by exertional symptoms and of course is never passed out in this setting. Otherwise has no chest pain no headaches no lower extremity edema. PMH: none    Current Outpatient Medications   Medication Sig Dispense Refill    norethindrone-ethinyl estradiol-iron (JUNEL FE 1.5/30, 28,) 1.5 mg-30 mcg (21)/75 mg (7) tab Take 1 Tab by mouth daily.          No Known Allergies    Social History     Socioeconomic History    Marital status: SINGLE     Spouse name: Not on file    Number of children: Not on file    Years of education: Not on file    Highest education level: Not on file   Occupational History    Not on file   Social Needs    Financial resource strain: Not on file    Food insecurity:     Worry: Not on file     Inability: Not on file    Transportation needs:     Medical: Not on file     Non-medical: Not on file   Tobacco Use    Smoking status: Never Smoker    Smokeless tobacco: Never Used   Substance and Sexual Activity    Alcohol use: Yes     Comment: socially/rarely    Drug use: No    Sexual activity: Yes     Birth control/protection: Pill   Lifestyle    Physical activity:     Days per week: Not on file     Minutes per session: Not on file    Stress: Not on file   Relationships    Social connections:     Talks on phone: Not on file     Gets together: Not on file     Attends Jain service: Not on file     Active member of club or organization: Not on file     Attends meetings of clubs or organizations: Not on file     Relationship status: Not on file    Intimate partner violence:     Fear of current or ex partner: Not on file     Emotionally abused: Not on file     Physically abused: Not on file     Forced sexual activity: Not on file   Other Topics Concern    Not on file   Social History Narrative    Not on file        Review of Systems    14 pt Review of Systems is negative unless otherwise mentioned in the HPI. Wt Readings from Last 3 Encounters:   03/20/19 73.9 kg (163 lb)   02/01/19 75.5 kg (166 lb 6.4 oz)   10/24/18 75.6 kg (166 lb 9.6 oz)     Temp Readings from Last 3 Encounters:   02/01/19 98.1 °F (36.7 °C) (Oral)   01/18/19 97.9 °F (36.6 °C)   10/24/18 98 °F (36.7 °C) (Oral)     BP Readings from Last 3 Encounters:   03/20/19 110/70   02/01/19 117/76   01/18/19 116/66     Pulse Readings from Last 3 Encounters:   03/20/19 70   02/01/19 77   01/18/19 83       Physical Exam:    Visit Vitals  /70   Pulse 70   Ht 5' 9.5\" (1.765 m)   Wt 73.9 kg (163 lb)   SpO2 98%   BMI 23.73 kg/m²      Physical Exam   Constitutional: She is oriented to person, place, and time. She appears well-developed and well-nourished. HENT:   Head: Normocephalic and atraumatic. Eyes: Pupils are equal, round, and reactive to light. EOM are normal.   Neck: No JVD present. Cardiovascular: Normal rate, regular rhythm, normal heart sounds and intact distal pulses. Exam reveals no gallop and no friction rub. No murmur heard. Pulmonary/Chest: Effort normal and breath sounds normal. No respiratory distress. She has no wheezes. She has no rales. She exhibits no tenderness. Abdominal: Soft. Bowel sounds are normal.   Musculoskeletal: She exhibits no edema or tenderness. Neurological: She is alert and oriented to person, place, and time. Skin: Skin is warm and dry. Psychiatric: She has a normal mood and affect. EKG today shows: NSR, normal axis and intervals, no ST segment abnormalities, QTc 423    Impression and Plan:  Evangelist Medina is a 26 y.o. with:    1.) Syncope  2.) Palpitations  3.) SOB    1.) 14 day event monitor  2.) Echocardiogram  3.) RTC 6-8 weeks, recheck symptoms    I spent significant time with patient and her father regarding her most recent symptoms.   Patient's EKG is normal as well as her exam today and the description of her symptoms do not suggest dangerous CV etiology (significant tachy/ isra arrhthymias, cardiomyopathy etc). I suspect the syncopal episode was most likely due to some type of neurocardiogenic syncope. But as she truly lost consciousness and is also complaining of unexplained palpitations and shortness of breath I felt further testing was warranted. Went ahead and set her up for some noninvasive testing and we will see her back in a few weeks to sit down and discuss further. 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 I really would like to give her more reassurance moving forward so she can work out and get back into CrossFit -and be scared. Further recommendations to follow. Thank you for allowing me to participate in the care of your patient, please do not hesitate to call with questions or concerns.       Srinath Munson, DO

## 2019-03-20 NOTE — PROGRESS NOTES
Alex Cea presents today for   Chief Complaint   Patient presents with    New Patient     referred by PCP for syncope     Dizziness     sometimes    Palpitations     racing and fluttering       Madison El preferred language for health care discussion is english/other. Is someone accompanying this pt? dad    Is the patient using any DME equipment during [address]? no    Depression Screening:  3 most recent PHQ Screens 2/1/2019   Little interest or pleasure in doing things Not at all   Feeling down, depressed, irritable, or hopeless Not at all   Total Score PHQ 2 0       Learning Assessment:  Learning Assessment 2/1/2019   PRIMARY LEARNER Patient   HIGHEST LEVEL OF EDUCATION - PRIMARY LEARNER  GRADUATED HIGH SCHOOL OR GED   BARRIERS PRIMARY LEARNER NONE   CO-LEARNER CAREGIVER No   PRIMARY LANGUAGE ENGLISH   LEARNER PREFERENCE PRIMARY DEMONSTRATION   ANSWERED BY PATIENT   RELATIONSHIP SELF       Abuse Screening:  Abuse Screening Questionnaire 2/1/2019   Do you ever feel afraid of your partner? N   Are you in a relationship with someone who physically or mentally threatens you? N   Is it safe for you to go home? Y       Fall Risk  Fall Risk Assessment, last 12 mths 2/1/2019   Able to walk? Yes   Fall in past 12 months? Yes   Fall with injury? No   Number of falls in past 12 months 1   Fall Risk Score 1       Pt currently taking Anticoagulant therapy? no    Coordination of Care:  1. Have you been to the ER, urgent care clinic since your last visit? Hospitalized since your last visit? 1/18/19 for syncope     2. Have you seen or consulted any other health care providers outside of the [address] [place] [address] [facility] since your last visit? Include any pap smears or colon screening.  no

## 2019-04-02 ENCOUNTER — TELEPHONE (OUTPATIENT)
Dept: INTERNAL MEDICINE CLINIC | Age: 24
End: 2019-04-02

## 2019-04-02 DIAGNOSIS — R55 SYNCOPE AND COLLAPSE: Primary | ICD-10-CM

## 2019-04-02 NOTE — TELEPHONE ENCOUNTER
She did not mention this symptoms before. Please let her know I will refer her to neurology for evaluation, and defer to them to decide if neuroimaging is required.  Thanks

## 2019-04-02 NOTE — TELEPHONE ENCOUNTER
Pt is calling and asking for a MRI ordered she feels tingling on side of her face like her cheek area she feels since falling and hitting her head she should have a MRI    PLEASE ADVISE

## 2019-04-05 ENCOUNTER — HOSPITAL ENCOUNTER (OUTPATIENT)
Dept: NON INVASIVE DIAGNOSTICS | Age: 24
Discharge: HOME | End: 2019-04-05
Attending: INTERNAL MEDICINE
Payer: COMMERCIAL

## 2019-04-05 VITALS
BODY MASS INDEX: 24.14 KG/M2 | WEIGHT: 163 LBS | DIASTOLIC BLOOD PRESSURE: 70 MMHG | HEIGHT: 69 IN | SYSTOLIC BLOOD PRESSURE: 110 MMHG

## 2019-04-05 DIAGNOSIS — R55 SYNCOPE, UNSPECIFIED SYNCOPE TYPE: ICD-10-CM

## 2019-04-05 LAB
ECHO AO ROOT DIAM: 3.21 CM
ECHO LA VOL BP: 50.91 ML (ref 22–52)
ECHO LA VOL/BSA BIPLANE: 26.88 ML/M2 (ref 16–28)
ECHO LV E' LATERAL VELOCITY: 10.92 CM/S
ECHO LV E' SEPTAL VELOCITY: 11.45 CM/S

## 2019-04-05 PROCEDURE — 93306 TTE W/DOPPLER COMPLETE: CPT

## 2019-04-05 NOTE — PROGRESS NOTES
Per your last note' Impression and Plan: 
Jessenia Trejo is a 26 y.o. with: 
  
1.) Syncope 2.) Palpitations 3.) SOB 
  
1.) 14 day event monitor 
2.) Echocardiogram 
3.) RTC 6-8 weeks, recheck symptoms 
  
I spent significant time with patient and her father regarding her most recent symptoms. Patient's EKG is normal as well as her exam today and the description of her symptoms do not suggest dangerous CV etiology (significant tachy/ isra arrhthymias, cardiomyopathy etc). I suspect the syncopal episode was most likely due to some type of neurocardiogenic syncope. But as she truly lost consciousness and is also complaining of unexplained palpitations and shortness of breath I felt further testing was warranted. Went ahead and set her up for some noninvasive testing and we will see her back in a few weeks to sit down and discuss further. 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 I really would like to give her more reassurance moving forward so she can work out and get back into [employer] -and be scared. Further recommendations to follow.

## 2019-04-08 ENCOUNTER — TELEPHONE (OUTPATIENT)
Dept: CARDIOLOGY CLINIC | Age: 24
End: 2019-04-08

## 2019-04-08 NOTE — TELEPHONE ENCOUNTER
----- Message from Amaris Olivier DO sent at 4/5/2019  4:53 PM EDT -----  You can let her know her echo was normal (still waiting on results of monitor), thanks    ----- Message -----  From: Elvin Alonzo LPN  Sent: 5/7/2115   3:32 PM  To: Amaris Olivier DO    Per your last note'  Impression and Plan:  Brian Mcfarland is a 26 y.o. with:     1.) Syncope  2.) Palpitations  3.) SOB     1.) 14 day event monitor  2.) Echocardiogram  3.) RTC 6-8 weeks, recheck symptoms     I spent significant time with patient and her father regarding her most recent symptoms. Patient's EKG is normal as well as her exam today and the description of her symptoms do not suggest dangerous CV etiology (significant tachy/ isra arrhthymias, cardiomyopathy etc). I suspect the syncopal episode was most likely due to some type of neurocardiogenic syncope. But as she truly lost consciousness and is also complaining of unexplained palpitations and shortness of breath I felt further testing was warranted. Went ahead and set her up for some noninvasive testing and we will see her back in a few weeks to sit down and discuss further. 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 I really would like to give her more reassurance moving forward so she can work out and get back into [employer] -and be scared. Further recommendations to follow.

## 2019-04-08 NOTE — LETTER
4/11/2019 12:41 PM 
 
Ms. [facility] [address] [facility] [address] Dear Ms. El, We have been unable to reach you by phone to notify you of your test results. Please call our office at [phone] and ask to speak with my nurse in order to explain these results to you and advise you of any recommendations. Sincerely, Rudy Simmons, DO

## 2019-04-17 ENCOUNTER — TELEPHONE (OUTPATIENT)
Dept: CARDIOLOGY CLINIC | Age: 24
End: 2019-04-17

## 2019-04-17 NOTE — TELEPHONE ENCOUNTER
----- Message from Carson Cannon DO sent at 4/17/2019  8:08 AM EDT -----  Please let her know echo was normal and can discuss details further at her next follow up appt. Thanks    ----- Message -----  From: Negrito Marrero LPN  Sent: 9/0/1645   3:32 PM  To: Acrsonchris Cannon DO    Per your last note'  Impression and Plan:  Sidra Giron is a 26 y.o. with:     1.) Syncope  2.) Palpitations  3.) SOB     1.) 14 day event monitor  2.) Echocardiogram  3.) RTC 6-8 weeks, recheck symptoms     I spent significant time with patient and her father regarding her most recent symptoms. Patient's EKG is normal as well as her exam today and the description of her symptoms do not suggest dangerous CV etiology (significant tachy/ isra arrhthymias, cardiomyopathy etc). I suspect the syncopal episode was most likely due to some type of neurocardiogenic syncope. But as she truly lost consciousness and is also complaining of unexplained palpitations and shortness of breath I felt further testing was warranted. Went ahead and set her up for some noninvasive testing and we will see her back in a few weeks to sit down and discuss further. 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 I really would like to give her more reassurance moving forward so she can work out and get back into [employer] -and be scared. Further recommendations to follow.

## 2019-05-08 ENCOUNTER — OFFICE VISIT (OUTPATIENT)
Dept: NEUROLOGY | Age: 24
End: 2019-05-08

## 2019-05-08 VITALS
RESPIRATION RATE: 16 BRPM | SYSTOLIC BLOOD PRESSURE: 100 MMHG | HEART RATE: 64 BPM | OXYGEN SATURATION: 98 % | BODY MASS INDEX: 24.44 KG/M2 | TEMPERATURE: 98.9 F | DIASTOLIC BLOOD PRESSURE: 62 MMHG | WEIGHT: 165 LBS | HEIGHT: 69 IN

## 2019-05-08 DIAGNOSIS — F07.81 POSTCONCUSSIVE SYNDROME: ICD-10-CM

## 2019-05-08 DIAGNOSIS — R55 SYNCOPE, UNSPECIFIED SYNCOPE TYPE: ICD-10-CM

## 2019-05-08 DIAGNOSIS — M54.2 CERVICALGIA: Primary | ICD-10-CM

## 2019-05-08 RX ORDER — METAXALONE 800 MG/1
800 TABLET ORAL 3 TIMES DAILY
Qty: 21 TAB | Refills: 0 | Status: SHIPPED | OUTPATIENT
Start: 2019-05-08 | End: 2019-05-15
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
## 2019-05-08 NOTE — LETTER
5/8/19 Patient: Bernice Keith YOB: 1995 Date of Visit: 5/8/2019 Sunny Alvarez MD 
[place] [id number] [place] [address] VIA In Basket Dear Sunny Alvarez MD, Thank you for referring Ms. Madison El to [facility] for evaluation. My notes for this consultation are attached. If you have questions, please do not hesitate to call me. I look forward to following your patient along with you.  
 
 
Sincerely, 
 
Britni Garland MD

## 2019-05-08 NOTE — PROGRESS NOTES
ROOM # 4    Madeline Garcia presents today for   Chief Complaint   Patient presents with    Establish Care    Loss of Consciousness       Madison El preferred language for health care discussion is english/other. Depression Screening:  3 most recent [facility] Screens 2/1/2019 10/24/2018 6/22/2018 4/4/2018   Little interest or pleasure in doing things Not at all Not at all Not at all Not at all   Feeling down, depressed, irritable, or hopeless Not at all Not at all Not at all Not at all   Total Score PHQ 2 0 0 0 0       Learning Assessment:  Learning Assessment 2/1/2019 2/25/2016   PRIMARY LEARNER Patient Patient   HIGHEST LEVEL OF EDUCATION - PRIMARY LEARNER  GRADUATED HIGH SCHOOL OR GED -   BARRIERS PRIMARY LEARNER NONE -   [address] CAREGIVER No -   PRIMARY LANGUAGE ENGLISH ENGLISH   LEARNER PREFERENCE PRIMARY DEMONSTRATION LISTENING   ANSWERED BY PATIENT patient   RELATIONSHIP SELF SELF       Abuse Screening:  Abuse Screening Questionnaire 2/1/2019   Do you ever feel afraid of your partner? N   Are you in a relationship with someone who physically or mentally threatens you? N   Is it safe for you to go home? Y       Fall Risk  Fall Risk Assessment, last 12 mths 2/1/2019   Able to walk? Yes   Fall in past 12 months? Yes   Fall with injury? No   Number of falls in past 12 months 1   Fall Risk Score 1       Visit Vitals  /62 (BP 1 Location: Left arm, BP Patient Position: Sitting)   Pulse 64   Temp 98.9 °F (37.2 °C) (Oral)   Resp 16   Ht 5' 9\" (1.753 m)   Wt 165 lb (74.8 kg)   SpO2 98%   BMI 24.37 kg/m²       Health Maintenance reviewed and discussed per provider. Yes    Madeline Garcia is due for   Health Maintenance Due   Topic Date Due    HPV Age 9Y-34Y (1 - Female 3-dose series) 08/09/2011    DTaP/Tdap/Td series (1 - Tdap) 08/09/2017    PAP AKA CERVICAL CYTOLOGY  08/09/2017     Please order/place referral if appropriate. Advance Directive:  1. Do you have an advance directive in place?  Patient Reply: No    2. If not, would you like material regarding how to put one in place? Patient Reply: No    Coordination of Care:  1. Have you been to the ER, urgent care clinic since your last visit? Hospitalized since your last visit?  No

## 2019-05-31 ENCOUNTER — TELEPHONE (OUTPATIENT)
Dept: CARDIOLOGY CLINIC | Age: 24
End: 2019-05-31

## 2019-05-31 NOTE — TELEPHONE ENCOUNTER
Event monitor results received from [employer] and reviewed by Dr. Jason Ch. Verbal order and read back per Mona Turner, DO  No concerning/ significant arrhythmias. This has been fully explained to the patient, who indicates understanding.

## 2019-08-29 ENCOUNTER — OFFICE VISIT (OUTPATIENT)
Dept: INTERNAL MEDICINE CLINIC | Age: 24
End: 2019-08-29

## 2019-08-29 VITALS
WEIGHT: 168 LBS | RESPIRATION RATE: 20 BRPM | BODY MASS INDEX: 24.88 KG/M2 | HEART RATE: 93 BPM | DIASTOLIC BLOOD PRESSURE: 83 MMHG | TEMPERATURE: 98.3 F | OXYGEN SATURATION: 98 % | SYSTOLIC BLOOD PRESSURE: 126 MMHG | HEIGHT: 69 IN

## 2019-08-29 DIAGNOSIS — G44.319 ACUTE POST-TRAUMATIC HEADACHE, NOT INTRACTABLE: ICD-10-CM

## 2019-08-29 DIAGNOSIS — G43.011 INTRACTABLE MIGRAINE WITHOUT AURA AND WITH STATUS MIGRAINOSUS: Primary | ICD-10-CM

## 2019-08-29 RX ORDER — PREDNISONE 20 MG/1
TABLET ORAL
Qty: 12 TAB | Refills: 0 | Status: SHIPPED | OUTPATIENT
Start: 2019-08-29

## 2019-08-29 RX ORDER — SUMATRIPTAN 100 MG/1
TABLET, FILM COATED ORAL
Qty: 9 TAB | Refills: 2 | Status: SHIPPED | OUTPATIENT
Start: 2019-08-29

## 2019-08-29 NOTE — PROGRESS NOTES
Glenn Mccoy presents today for   Chief Complaint   Patient presents with    Sinus Pain     Patient reports having sinus pain that is now causing a migraine x 3 days. Patient has tried ibuprofen without relief. Depression Screening:  3 most recent PHQ Screens 2/1/2019   Little interest or pleasure in doing things Not at all   Feeling down, depressed, irritable, or hopeless Not at all   Total Score PHQ 2 0       Learning Assessment:  Learning Assessment 2/1/2019   PRIMARY LEARNER Patient   HIGHEST LEVEL OF EDUCATION - PRIMARY LEARNER  GRADUATED HIGH SCHOOL OR GED   BARRIERS PRIMARY LEARNER NONE   CO-LEARNER CAREGIVER No   PRIMARY LANGUAGE ENGLISH   LEARNER PREFERENCE PRIMARY DEMONSTRATION   ANSWERED BY PATIENT   RELATIONSHIP SELF       Abuse Screening:  Abuse Screening Questionnaire 2/1/2019   Do you ever feel afraid of your partner? N   Are you in a relationship with someone who physically or mentally threatens you? N   Is it safe for you to go home? Y       Fall Risk  Fall Risk Assessment, last 12 mths 2/1/2019   Able to walk? Yes   Fall in past 12 months? Yes   Fall with injury? No   Number of falls in past 12 months 1   Fall Risk Score 1           Coordination of Care:  1. Have you been to the ER, urgent care clinic since your last visit? Hospitalized since your last visit? no    2. Have you seen or consulted any other health care providers outside of the [address] [place] [address] [place] since your last visit? Include any pap smears or colon screening. yes      Advance Directive:  1. Do you have an advance directive in place? Patient Reply:no    2. If not, would you like material regarding how to put one in place?  Patient Reply: no

## 2019-08-29 NOTE — PROGRESS NOTES
Arely Heaton 1995, is a 22 y.o. female, who is seen today for a sinus headache. 3 days ago she developed discomfort in both maxillary regions and within 2 hours it was hurting in the right occipital region and shortly thereafter left. Seems to be worse sometimes on the left and sometimes on the right but has been quite severe and yesterday she vomited several times. She has had a little drainage of clear mucus from her nose the last couple of days. She also had an episode of syncope in January and hit her head very hard, seen by a neurologist and had ongoing headaches for quite a while but never had a CT and is uncomfortable but that was never done. She has had probable migraine headaches before the trauma and more so after the trauma but not terribly frequently lately. She has never been on sumatriptan or other migraine medicine if she has been using ibuprofen the last few days without significant benefit. History reviewed. No pertinent past medical history. Current Outpatient Medications   Medication Sig Dispense Refill    norethindrone-ethinyl estradiol-iron (JUNEL FE 1.5/30, 28,) 1.5 mg-30 mcg (21)/75 mg (7) tab Take 1 Tab by mouth daily. Visit Vitals  /83   Pulse 93   Temp 98.3 °F (36.8 °C) (Oral)   Resp 20   Ht 5' 9\" (1.753 m)   Wt 168 lb (76.2 kg)   SpO2 98%   BMI 24.81 kg/m²     Nasal passages are clear. Maxillary and frontal areas are mildly tender in the septal region is mildly tender and there is some mild tenderness of the posterior neck muscles. Good range of motion of her neck with only minimal discomfort with flexion of the neck. No adenopathy. Pharynx reveals no redness exudate or drainage. Assessment: #1. Head trauma in January with prolonged headache afterward, CT was never done and they do recommend that she get a CT at this point because of that trauma and headache pattern that is ongoing. #2.   No evidence of sinus infection currently, I believe she has status migrainous, is been going on for a little over 72 hours. Will treat with sumatriptan 100 mg but if the head pain is not a lot better in 2 hours she will start prednisone 60 mg daily for 2 days and then 40 mg daily for 3 more days. If she has fairly frequent headaches in the future she may need suppressive therapy, we did discuss that. Also she has headaches in the future she will use sumatriptan early on. She also may use ibuprofen over the next few days for incremental benefit. Shai Bender MD FACP    Please note: This document has been produced using voice recognition software. Unrecognized errors in transcription may be present. This visit lasted 25 minutes, greater than 50% of the time was spent counseling as above.

## 2019-08-30 ENCOUNTER — TELEPHONE (OUTPATIENT)
Dept: INTERNAL MEDICINE CLINIC | Age: 24
End: 2019-08-30

## 2019-08-30 NOTE — TELEPHONE ENCOUNTER
Mars Galeana MD  [facility] Nurses 43 minutes ago (8:46 AM)      She could use Imitrex/sumatriptan again today, try using just 2 prednisone tablets today and tomorrow, I have not heard of people having such bad reaction to prednisone before.     Routing comment

## 2019-08-30 NOTE — TELEPHONE ENCOUNTER
Patient was seen yesterday for a migraine that had lasted for more than 24 hours. Was prescribed Imitrex and Prednisone. She took the Imitrex which worked for a couple hours and then the migraine came back. Stating the Prednisone made her extremely sick and dizzy. Please advise.